# Patient Record
Sex: FEMALE | Race: WHITE | Employment: OTHER | URBAN - METROPOLITAN AREA
[De-identification: names, ages, dates, MRNs, and addresses within clinical notes are randomized per-mention and may not be internally consistent; named-entity substitution may affect disease eponyms.]

---

## 2017-01-11 ENCOUNTER — TELEPHONE (OUTPATIENT)
Dept: FAMILY MEDICINE | Facility: CLINIC | Age: 82
End: 2017-01-11

## 2017-01-11 ENCOUNTER — DOCUMENTATION ONLY (OUTPATIENT)
Dept: LAB | Facility: CLINIC | Age: 82
End: 2017-01-11

## 2017-01-11 DIAGNOSIS — N18.30 CKD (CHRONIC KIDNEY DISEASE) STAGE 3, GFR 30-59 ML/MIN (H): ICD-10-CM

## 2017-01-11 DIAGNOSIS — E78.5 HYPERLIPIDEMIA LDL GOAL <130: ICD-10-CM

## 2017-01-11 DIAGNOSIS — I10 HYPERTENSION GOAL BP (BLOOD PRESSURE) < 140/90: Primary | ICD-10-CM

## 2017-01-11 RX ORDER — LISINOPRIL AND HYDROCHLOROTHIAZIDE 20; 25 MG/1; MG/1
TABLET ORAL
Qty: 45 TABLET | Refills: 0 | Status: SHIPPED | OUTPATIENT
Start: 2017-01-11 | End: 2017-01-23

## 2017-01-11 NOTE — PROGRESS NOTES
This patient has a lab only appointment on 1/16/2017 but does not have future orders. Please review, associate diagnosis and sign pending lab orders for the upcoming appointment.  She has an appointment with Dr. Russell on 1/23/2017.    Thank you,    [facility]

## 2017-01-11 NOTE — TELEPHONE ENCOUNTER
Reason for Call:  Medication or medication refill:    Do you use a [facility]?  Name of the pharmacy and phone number for the current request:  [facility] [phone]    Name of the medication requested: lisinopril-hydrochlorothiazide (PRINZIDE,ZESTORETIC) 20-25 MG per tablet    Other request: n/a    Can we leave a detailed message on this number? YES    Phone number patient can be reached at: Home number on file [phone] (home)    Best Time: anytime    Call taken on 1/11/2017 at 12:57 PM by Roberto Dooley

## 2017-01-16 DIAGNOSIS — N18.30 CKD (CHRONIC KIDNEY DISEASE) STAGE 3, GFR 30-59 ML/MIN (H): ICD-10-CM

## 2017-01-16 DIAGNOSIS — I10 HYPERTENSION GOAL BP (BLOOD PRESSURE) < 140/90: ICD-10-CM

## 2017-01-16 LAB
ALBUMIN SERPL-MCNC: 3.5 G/DL (ref 3.4–5)
ANION GAP SERPL CALCULATED.3IONS-SCNC: 10 MMOL/L (ref 3–14)
BUN SERPL-MCNC: 50 MG/DL (ref 7–30)
CALCIUM SERPL-MCNC: 9.7 MG/DL (ref 8.5–10.1)
CHLORIDE SERPL-SCNC: 110 MMOL/L (ref 94–109)
CO2 SERPL-SCNC: 24 MMOL/L (ref 20–32)
CREAT SERPL-MCNC: 1.61 MG/DL (ref 0.52–1.04)
ERYTHROCYTE [DISTWIDTH] IN BLOOD BY AUTOMATED COUNT: 13.1 % (ref 10–15)
GFR SERPL CREATININE-BSD FRML MDRD: 31 ML/MIN/1.7M2
GLUCOSE SERPL-MCNC: 106 MG/DL (ref 70–99)
HCT VFR BLD AUTO: 33.7 % (ref 35–47)
HGB BLD-MCNC: 10.8 G/DL (ref 11.7–15.7)
MCH RBC QN AUTO: 29.8 PG (ref 26.5–33)
MCHC RBC AUTO-ENTMCNC: 32 G/DL (ref 31.5–36.5)
MCV RBC AUTO: 93 FL (ref 78–100)
PHOSPHATE SERPL-MCNC: 3.1 MG/DL (ref 2.5–4.5)
PLATELET # BLD AUTO: 274 10E9/L (ref 150–450)
POTASSIUM SERPL-SCNC: 4.6 MMOL/L (ref 3.4–5.3)
RBC # BLD AUTO: 3.62 10E12/L (ref 3.8–5.2)
SODIUM SERPL-SCNC: 144 MMOL/L (ref 133–144)
WBC # BLD AUTO: 7.9 10E9/L (ref 4–11)

## 2017-01-16 PROCEDURE — 36415 COLL VENOUS BLD VENIPUNCTURE: CPT | Performed by: FAMILY MEDICINE

## 2017-01-16 PROCEDURE — 85027 COMPLETE CBC AUTOMATED: CPT | Performed by: FAMILY MEDICINE

## 2017-01-16 PROCEDURE — 80069 RENAL FUNCTION PANEL: CPT | Performed by: FAMILY MEDICINE

## 2017-01-23 ENCOUNTER — OFFICE VISIT (OUTPATIENT)
Dept: FAMILY MEDICINE | Facility: CLINIC | Age: 82
End: 2017-01-23
Payer: MEDICARE

## 2017-01-23 VITALS
BODY MASS INDEX: 27.73 KG/M2 | SYSTOLIC BLOOD PRESSURE: 138 MMHG | HEART RATE: 84 BPM | DIASTOLIC BLOOD PRESSURE: 78 MMHG | TEMPERATURE: 98.3 F | WEIGHT: 142 LBS | OXYGEN SATURATION: 97 %

## 2017-01-23 DIAGNOSIS — E78.5 HYPERLIPIDEMIA LDL GOAL <130: ICD-10-CM

## 2017-01-23 DIAGNOSIS — I10 HYPERTENSION GOAL BP (BLOOD PRESSURE) < 140/90: Primary | ICD-10-CM

## 2017-01-23 DIAGNOSIS — N18.30 CKD (CHRONIC KIDNEY DISEASE) STAGE 3, GFR 30-59 ML/MIN (H): ICD-10-CM

## 2017-01-23 DIAGNOSIS — M10.00 IDIOPATHIC GOUT, UNSPECIFIED CHRONICITY, UNSPECIFIED SITE: ICD-10-CM

## 2017-01-23 PROCEDURE — 99214 OFFICE O/P EST MOD 30 MIN: CPT | Performed by: FAMILY MEDICINE

## 2017-01-23 RX ORDER — LISINOPRIL AND HYDROCHLOROTHIAZIDE 20; 25 MG/1; MG/1
TABLET ORAL
Qty: 45 TABLET | Refills: 1 | Status: SHIPPED | OUTPATIENT
Start: 2017-01-23 | End: 2017-08-04

## 2017-01-23 RX ORDER — ATENOLOL 100 MG/1
50 TABLET ORAL 2 TIMES DAILY
Qty: 90 TABLET | Refills: 3 | Status: SHIPPED | OUTPATIENT
Start: 2017-01-23 | End: 2017-07-18

## 2017-01-23 NOTE — NURSING NOTE
Chief Complaint   Patient presents with     Hypertension       Initial /60 mmHg  Pulse 84  Temp(Src) 98.3  F (36.8  C) (Oral)  Wt 142 lb (64.411 kg)  SpO2 97% Estimated body mass index is 27.73 kg/(m^2) as calculated from the following:    Height as of 4/22/16: 5' (1.524 m).    Weight as of this encounter: 142 lb (64.411 kg).  BP completed using cuff size: cameron Hamilton, CMA

## 2017-06-20 DIAGNOSIS — D64.89 ANEMIA DUE TO OTHER CAUSE, NOT CLASSIFIED: ICD-10-CM

## 2017-06-20 DIAGNOSIS — N18.30 CKD (CHRONIC KIDNEY DISEASE) STAGE 3, GFR 30-59 ML/MIN (H): Primary | ICD-10-CM

## 2017-06-20 DIAGNOSIS — I10 HYPERTENSION GOAL BP (BLOOD PRESSURE) < 140/90: ICD-10-CM

## 2017-06-20 DIAGNOSIS — E78.5 HYPERLIPIDEMIA LDL GOAL <130: ICD-10-CM

## 2017-06-20 RX ORDER — SIMVASTATIN 10 MG
TABLET ORAL
Qty: 15 TABLET | Refills: 0 | Status: SHIPPED | OUTPATIENT
Start: 2017-06-20 | End: 2017-07-18

## 2017-06-20 NOTE — TELEPHONE ENCOUNTER
Medication is being filled for 1 time refill only due to:  Over due for fast labs. 30 day supply sent.     - please send reminder.     Grcae Carrion RN   [facility]

## 2017-06-20 NOTE — LETTER
[facility]                                           [address] [place]  [address]    June 21, 2017    Nan Doran  [address]  [facility] [address]    Dear Nan,       We recently received a refill request for simvastatin.  We have refilled this for a one time 30 day supply only because you are due for a:    Blood pressure office visit and fasting lab appointment      Please schedule this lab appointment 4-5 days prior to the office visit.     Please call at your earliest convenience so that there will not be a delay with your future refills.          Thank you,   Your [facility] Care Team/  [phone]

## 2017-07-18 DIAGNOSIS — N18.30 CKD (CHRONIC KIDNEY DISEASE) STAGE 3, GFR 30-59 ML/MIN (H): ICD-10-CM

## 2017-07-18 DIAGNOSIS — E78.5 HYPERLIPIDEMIA LDL GOAL <130: ICD-10-CM

## 2017-07-18 DIAGNOSIS — I10 HYPERTENSION GOAL BP (BLOOD PRESSURE) < 140/90: ICD-10-CM

## 2017-07-18 DIAGNOSIS — M10.00 IDIOPATHIC GOUT, UNSPECIFIED CHRONICITY, UNSPECIFIED SITE: Primary | ICD-10-CM

## 2017-07-18 NOTE — TELEPHONE ENCOUNTER
Patient requesting refill of simvastatin to make it to appointment in August.      Atenolol message states 100 mg is currently on back order, would like a prescription change to 50 mg bid.

## 2017-07-19 RX ORDER — ATENOLOL 50 MG/1
50 TABLET ORAL 2 TIMES DAILY
Qty: 180 TABLET | Refills: 1 | Status: SHIPPED | OUTPATIENT
Start: 2017-07-19 | End: 2017-08-04

## 2017-07-19 RX ORDER — SIMVASTATIN 10 MG
TABLET ORAL
Qty: 15 TABLET | Refills: 0 | Status: SHIPPED | OUTPATIENT
Start: 2017-07-19 | End: 2017-08-04

## 2017-07-28 DIAGNOSIS — N18.30 CKD (CHRONIC KIDNEY DISEASE) STAGE 3, GFR 30-59 ML/MIN (H): ICD-10-CM

## 2017-07-28 DIAGNOSIS — I10 HYPERTENSION GOAL BP (BLOOD PRESSURE) < 140/90: ICD-10-CM

## 2017-07-28 LAB
ALBUMIN SERPL-MCNC: 3.2 G/DL (ref 3.4–5)
ANION GAP SERPL CALCULATED.3IONS-SCNC: 8 MMOL/L (ref 3–14)
BUN SERPL-MCNC: 42 MG/DL (ref 7–30)
CALCIUM SERPL-MCNC: 9.9 MG/DL (ref 8.5–10.1)
CHLORIDE SERPL-SCNC: 108 MMOL/L (ref 94–109)
CO2 SERPL-SCNC: 23 MMOL/L (ref 20–32)
CREAT SERPL-MCNC: 1.41 MG/DL (ref 0.52–1.04)
ERYTHROCYTE [DISTWIDTH] IN BLOOD BY AUTOMATED COUNT: 12.9 % (ref 10–15)
GFR SERPL CREATININE-BSD FRML MDRD: 36 ML/MIN/1.7M2
GLUCOSE SERPL-MCNC: 93 MG/DL (ref 70–99)
HCT VFR BLD AUTO: 32.7 % (ref 35–47)
HGB BLD-MCNC: 10.5 G/DL (ref 11.7–15.7)
MCH RBC QN AUTO: 30.3 PG (ref 26.5–33)
MCHC RBC AUTO-ENTMCNC: 32.1 G/DL (ref 31.5–36.5)
MCV RBC AUTO: 95 FL (ref 78–100)
PHOSPHATE SERPL-MCNC: 3.9 MG/DL (ref 2.5–4.5)
PLATELET # BLD AUTO: 279 10E9/L (ref 150–450)
POTASSIUM SERPL-SCNC: 4.6 MMOL/L (ref 3.4–5.3)
RBC # BLD AUTO: 3.46 10E12/L (ref 3.8–5.2)
SODIUM SERPL-SCNC: 139 MMOL/L (ref 133–144)
WBC # BLD AUTO: 8.2 10E9/L (ref 4–11)

## 2017-07-28 PROCEDURE — 80069 RENAL FUNCTION PANEL: CPT | Performed by: FAMILY MEDICINE

## 2017-07-28 PROCEDURE — 36415 COLL VENOUS BLD VENIPUNCTURE: CPT | Performed by: FAMILY MEDICINE

## 2017-07-28 PROCEDURE — 85027 COMPLETE CBC AUTOMATED: CPT | Performed by: FAMILY MEDICINE

## 2017-08-04 ENCOUNTER — OFFICE VISIT (OUTPATIENT)
Dept: FAMILY MEDICINE | Facility: CLINIC | Age: 82
End: 2017-08-04
Payer: MEDICARE

## 2017-08-04 VITALS
DIASTOLIC BLOOD PRESSURE: 88 MMHG | OXYGEN SATURATION: 98 % | BODY MASS INDEX: 27.82 KG/M2 | SYSTOLIC BLOOD PRESSURE: 137 MMHG | HEART RATE: 77 BPM | HEIGHT: 59 IN | TEMPERATURE: 99.5 F | WEIGHT: 138 LBS

## 2017-08-04 DIAGNOSIS — M10.00 IDIOPATHIC GOUT, UNSPECIFIED CHRONICITY, UNSPECIFIED SITE: ICD-10-CM

## 2017-08-04 DIAGNOSIS — E78.5 HYPERLIPIDEMIA LDL GOAL <130: ICD-10-CM

## 2017-08-04 DIAGNOSIS — I10 HYPERTENSION GOAL BP (BLOOD PRESSURE) < 140/90: ICD-10-CM

## 2017-08-04 PROCEDURE — 99214 OFFICE O/P EST MOD 30 MIN: CPT | Performed by: FAMILY MEDICINE

## 2017-08-04 RX ORDER — ATENOLOL 50 MG/1
50 TABLET ORAL 2 TIMES DAILY
Qty: 180 TABLET | Refills: 1 | Status: SHIPPED | OUTPATIENT
Start: 2017-08-04 | End: 2018-02-16

## 2017-08-04 RX ORDER — SIMVASTATIN 10 MG
TABLET ORAL
Qty: 45 TABLET | Refills: 3 | Status: SHIPPED | OUTPATIENT
Start: 2017-08-04 | End: 2018-02-16

## 2017-08-04 RX ORDER — LISINOPRIL AND HYDROCHLOROTHIAZIDE 20; 25 MG/1; MG/1
TABLET ORAL
Qty: 45 TABLET | Refills: 1 | Status: SHIPPED | OUTPATIENT
Start: 2017-08-04 | End: 2018-02-16

## 2017-08-04 NOTE — MR AVS SNAPSHOT
"              After Visit Summary   2017    Nan Doran    MRN: [id number]           Patient Information     Date Of Birth          1935        Visit Information        Provider Department      2017 11:20 AM Naga Russell MD [facility]        Today's Diagnoses     Hypertension goal BP (blood pressure) < 140/90        Hyperlipidemia LDL goal <130        Idiopathic gout, unspecified chronicity, unspecified site           Follow-ups after your visit        Who to contact     If you have questions or need follow up information about today's clinic visit or your schedule please contact [facility] directly at [phone].  Normal or non-critical lab and imaging results will be communicated to you by MyChart, letter or phone within 4 business days after the clinic has received the results. If you do not hear from us within 7 days, please contact the clinic through [employer] or phone. If you have a critical or abnormal lab result, we will notify you by phone as soon as possible.  Submit refill requests through [employer] or call your pharmacy and they will forward the refill request to us. Please allow 3 business days for your refill to be completed.          Additional Information About Your Visit        MyChart Information     [employer] lets you send messages to your doctor, view your test results, renew your prescriptions, schedule appointments and more. To sign up, go to [email or web address] . Click on \"Log in\" on the left side of the screen, which will take you to the Welcome page. Then click on \"Sign up Now\" on the right side of the page.     You will be asked to enter the access code listed below, as well as some personal information. Please follow the directions to create your username and password.     Your access code is: [email or web address]  Expires: 2017 11:48 AM     Your access code will  in 90 days. If you need help or a new code, please call your [facility] " "or [phone].        Care EveryWhere ID     This is your Care EveryWhere ID. This could be used by other organizations to access your [place] medical records  [id number]        Your Vitals Were     Pulse Temperature Height Pulse Oximetry BMI (Body Mass Index)       77 99.5  F (37.5  C) (Oral) 4' 11\" (1.499 m) 98% 27.87 kg/m2        Blood Pressure from Last 3 Encounters:   08/04/17 137/88   01/23/17 138/78   04/22/16 135/77    Weight from Last 3 Encounters:   08/04/17 138 lb (62.6 kg)   01/23/17 142 lb (64.4 kg)   04/22/16 146 lb (66.2 kg)              Today, you had the following     No orders found for display         Today's Medication Changes          These changes are accurate as of: 8/4/17 11:48 AM.  If you have any questions, ask your nurse or doctor.               These medicines have changed or have updated prescriptions.        Dose/Directions    atenolol 50 MG tablet   Commonly known as:  TENORMIN   This may have changed:  additional instructions   Used for:  Hypertension goal BP (blood pressure) < 140/90        Dose:  50 mg   Take 1 tablet (50 mg) by mouth 2 times daily For blood pressure Pharmacy ok to hold prescription until due   Quantity:  180 tablet   Refills:  1       simvastatin 10 MG tablet   Commonly known as:  ZOCOR   This may have changed:  additional instructions   Used for:  Hyperlipidemia LDL goal <130        Take by mouth At Bedtime for cholesterol. 1/2 tab daily   Quantity:  45 tablet   Refills:  3            Where to get your medicines      These medications were sent to [id number] - [place] - [address]  [address] [address]    Hours:  Same info as Earl  Marika Phone:  [phone]     atenolol 50 MG tablet    lisinopril-hydrochlorothiazide 20-25 MG per tablet    simvastatin 10 MG tablet                Primary Care Provider Office Phone # Fax #    Naga Russell [phone]       [address] " [place] [address]        Equal Access to Services     [facility] : Hadii dennis damon marcia Soheber, waaxda luqadaha, qaybta kaalmawilver mongeisaiaswilver, matias lindquistmilereji josé. So [facility] [phone].    ATENCIÓN: Si habla español, tiene a martinez disposición servicios gratuitos de asistencia lingüística. [facility] [phone].    We comply with applicable federal civil rights laws and [place] laws. We do not discriminate on the basis of race, color, national origin, age, disability sex, sexual orientation or gender identity.            Thank you!     Thank you for choosing [facility]  for your care. Our goal is always to provide you with excellent care. Hearing back from our patients is one way we can continue to improve our services. Please take a few minutes to complete the written survey that you may receive in the mail after your visit with us. Thank you!             Your Updated Medication List - Protect others around you: Learn how to safely use, store and throw away your medicines at www.disposemymeds.org.          This list is accurate as of: 8/4/17 11:48 AM.  Always use your most recent med list.                   Brand Name Dispense Instructions for use Diagnosis    aspirin 81 MG tablet      1 tab twice weekly.        atenolol 50 MG tablet    TENORMIN    180 tablet    Take 1 tablet (50 mg) by mouth 2 times daily For blood pressure Pharmacy ok to hold prescription until due    Hypertension goal BP (blood pressure) < 140/90       colchicine 0.6 MG tablet    COLCRYS    6 tablet    Take 2 tablets at the first sign of gout flare, take 1 additional tablet one hour later.    Idiopathic gout, unspecified chronicity, unspecified site       lisinopril-hydrochlorothiazide 20-25 MG per tablet    PRINZIDE/ZESTORETIC    45 tablet    For blood pressure in AM Take  by mouth daily. 1/2 tab daily Pharmacy ok to hold prescription until due    Hypertension goal BP (blood pressure) < 140/90        simvastatin 10 MG tablet    ZOCOR    45 tablet    Take by mouth At Bedtime for cholesterol. 1/2 tab daily    Hyperlipidemia LDL goal <130

## 2017-08-04 NOTE — PROGRESS NOTES
"SUBJECTIVE:  Nan Doran, a 82 year old female scheduled an appointment to discuss the following issues:  Follow-up htn, gout, high cholesterol, cri and anemia.  Cr improving and hgb stable. Emotionally ok.k no chest pain or shortness of breath. No nausea, vomiting or diarrhea or black/bloody stools. No acute urine changes.   Exercise some but knee painful and not as much walking as before. Active.  No chest pain or shortness of breath. Outside blood pressure reading running ok 130/80.. No hematuria.   Drinking a little more water. No urine accidents.   No gout attacks in  Years. History tophi  Medical, social, surgical, and family histories reviewed.    OBJECTIVE:  /88  Pulse 77  Temp 99.5  F (37.5  C) (Oral)  Ht 4' 11\" (1.499 m)  Wt 138 lb (62.6 kg)  SpO2 98%  BMI 27.87 kg/m2  EXAM:  GENERAL APPEARANCE: healthy, alert and no distress  EYES: EOMI,  PERRL  NECK: no adenopathy, no asymmetry, masses, or scars and thyroid normal to palpation  RESP: lungs clear to auscultation - no rales, rhonchi or wheezes  CV: regular rates and rhythm, normal S1 S2, no S3 or S4 and no murmur, click or rub -  ABDOMEN:  soft, nontender, no HSM or masses and bowel sounds normal  MS: extremities normal- no gross deformities noted, no evidence of inflammation in joints, FROM in all extremities.  MS: gout tophi seen scattered on finger and most note around distal right thumb.  PSYCH: mentation appears normal and affect normal/bright    ASSESSMENT / PLAN:  (I10) Hypertension goal BP (blood pressure) < 140/90  Comment: stable  Plan: atenolol (TENORMIN) 50 MG tablet,         lisinopril-hydrochlorothiazide         (PRINZIDE/ZESTORETIC) 20-25 MG per tablet        Continue lots of water. Patient taking hctz in pm - change to AM    (E78.5) Hyperlipidemia LDL goal <130  Comment: stable in past  Plan: simvastatin (ZOCOR) 10 MG tablet        Continue exercise. Chest pain or shortness of breath to er. Reveiwed risks and side effects of " medication      (M10.00) Idiopathic gout, unspecified chronicity, unspecified site  Comment: stable  Plan: discussed tophi on hands and allopurinol. Patient NOT interested at this point and no gout attacks in years. Consider d/c hctz. Lots of water and diet. Call/email with questions/concerns

## 2018-02-16 ENCOUNTER — RADIANT APPOINTMENT (OUTPATIENT)
Dept: GENERAL RADIOLOGY | Facility: CLINIC | Age: 83
End: 2018-02-16
Attending: FAMILY MEDICINE
Payer: MEDICARE

## 2018-02-16 ENCOUNTER — OFFICE VISIT (OUTPATIENT)
Dept: FAMILY MEDICINE | Facility: CLINIC | Age: 83
End: 2018-02-16
Payer: MEDICARE

## 2018-02-16 VITALS
WEIGHT: 140 LBS | HEART RATE: 79 BPM | BODY MASS INDEX: 28.28 KG/M2 | SYSTOLIC BLOOD PRESSURE: 146 MMHG | DIASTOLIC BLOOD PRESSURE: 82 MMHG | TEMPERATURE: 98.8 F | OXYGEN SATURATION: 99 %

## 2018-02-16 DIAGNOSIS — M25.571 PAIN IN JOINT, ANKLE AND FOOT, RIGHT: Primary | ICD-10-CM

## 2018-02-16 DIAGNOSIS — M10.00 IDIOPATHIC GOUT, UNSPECIFIED CHRONICITY, UNSPECIFIED SITE: ICD-10-CM

## 2018-02-16 DIAGNOSIS — E78.5 HYPERLIPIDEMIA LDL GOAL <130: ICD-10-CM

## 2018-02-16 DIAGNOSIS — M25.571 PAIN IN JOINT, ANKLE AND FOOT, RIGHT: ICD-10-CM

## 2018-02-16 DIAGNOSIS — I10 HYPERTENSION GOAL BP (BLOOD PRESSURE) < 140/90: ICD-10-CM

## 2018-02-16 PROCEDURE — 99214 OFFICE O/P EST MOD 30 MIN: CPT | Performed by: FAMILY MEDICINE

## 2018-02-16 PROCEDURE — 73610 X-RAY EXAM OF ANKLE: CPT | Mod: RT

## 2018-02-16 RX ORDER — SIMVASTATIN 10 MG
TABLET ORAL
Qty: 45 TABLET | Refills: 0 | Status: SHIPPED | OUTPATIENT
Start: 2018-02-16 | End: 2018-05-18

## 2018-02-16 RX ORDER — PREDNISONE 10 MG/1
TABLET ORAL
Qty: 12 TABLET | Refills: 1 | Status: SHIPPED | OUTPATIENT
Start: 2018-02-16 | End: 2018-05-18

## 2018-02-16 RX ORDER — ATENOLOL 50 MG/1
50 TABLET ORAL 2 TIMES DAILY
Qty: 180 TABLET | Refills: 0 | Status: SHIPPED | OUTPATIENT
Start: 2018-02-16 | End: 2018-05-18

## 2018-02-16 RX ORDER — LISINOPRIL AND HYDROCHLOROTHIAZIDE 20; 25 MG/1; MG/1
TABLET ORAL
Qty: 45 TABLET | Refills: 0 | Status: SHIPPED | OUTPATIENT
Start: 2018-02-16 | End: 2018-05-09

## 2018-02-16 NOTE — PROGRESS NOTES
SUBJECTIVE:  Nan Doran, a 82 year old female scheduled an appointment to discuss the following issues:  right ankle pain. Started 5 days ago.   History plantar fasiciitis in past.   Some swelling and was sitting for awhile. No Major injury but occurred getting up from chair.  No history dvt. No recent travel.   No gout attacks a while.   Heat. No wrapping.     Past Medical History:   Diagnosis Date     Gout      HTN      Hypercholesteremia      Hyperglycemia        Past Surgical History:   Procedure Laterality Date     GYN SURGERY      tubal     HC DUPLEX SCAN EXTRACRANIAL, LIMITED UNILATERAL      normal carotid ultrasound 8/2006     HEAD & NECK SURGERY      tonsils       Family History   Problem Relation Age of Onset     CEREBROVASCULAR DISEASE Father      DIABETES Paternal Grandmother      Blood Disease Daughter      lupus     Blood Disease Daughter      lupus       Social History   Substance Use Topics     Smoking status: Former Smoker     Quit date: 1/1/1984     Smokeless tobacco: Never Used     Alcohol use No       ROS:  All other ROS negative    OBJECTIVE:  /82  Pulse 79  Temp 98.8  F (37.1  C) (Oral)  Wt 140 lb (63.5 kg)  SpO2 99%  BMI 28.28 kg/m2  EXAM:  GENERAL APPEARANCE: healthy, alert and no distress  EYES: EOMI,  PERRL  NECK: no adenopathy, no asymmetry, masses, or scars and thyroid normal to palpation  RESP: lungs clear to auscultation - no rales, rhonchi or wheezes  CV: regular rates and rhythm, normal S1 S2, no S3 or S4 and no murmur, click or rub -  ABDOMEN:  soft, nontender, no HSM or masses and bowel sounds normal  MS: extremities normal- no gross deformities noted, no evidence of inflammation in joints, FROM in all extremities.  MS: swelling and mild warmth/tenderness right lateral ankle and top of lateral right foot. No redness. Some pain with RANGE OF MOTION ankle.  SKIN: no suspicious lesions or rashes  NEURO: Normal strength and tone, sensory exam grossly normal, mentation intact  and speech normal  PSYCH: mentation appears normal and affect normal/bright  PSYCH: mildly anxious    ASSESSMENT / PLAN:  (M25.571) Pain in joint, ankle and foot, right  (primary encounter diagnosis)  Comment: strain vs gout  Plan: XR Ankle Right G/E 3 Views        Ace wrap/ice and elevated. Follow-up ortho if not improving  To ER if swelling up calf/thigh or chest pain or shortness of breath  (M10.00) Idiopathic gout, unspecified chronicity, unspecified site  Comment: good chance ankle pain with gout  Plan: predniSONE (DELTASONE) 10 MG tablet        Reveiwed risks and side effects of medication  Patient NOT interested in allopurinol. Drink lots of water.    (I10) Hypertension goal BP (blood pressure) < 140/90  Plan: atenolol (TENORMIN) 50 MG tablet,         lisinopril-hydrochlorothiazide         (PRINZIDE/ZESTORETIC) 20-25 MG per tablet     A little high today. Return to clinic in next month for recheck blood pressure/labs. Chest pain or shortness of breath to er    (E78.5) Hyperlipidemia LDL goal <130  Plan: simvastatin (ZOCOR) 10 MG tablet        Future labs. Reveiwed risks and side effects of medication        Naga Russell

## 2018-02-16 NOTE — MR AVS SNAPSHOT
"              After Visit Summary   2018    Nan Doran    MRN: [id number]           Patient Information     Date Of Birth          1935        Visit Information        Provider Department      2018 3:40 PM Naga Russell MD [facility]        Today's Diagnoses     Pain in joint, ankle and foot, right    -  1    Idiopathic gout, unspecified chronicity, unspecified site           Follow-ups after your visit        Who to contact     If you have questions or need follow up information about today's clinic visit or your schedule please contact [facility] directly at [phone].  Normal or non-critical lab and imaging results will be communicated to you by [employer] [facility] [employer], letter or phone within 4 business days after the clinic has received the results. If you do not hear from us within 7 days, please contact the clinic through [employer] or phone. If you have a critical or abnormal lab result, we will notify you by phone as soon as possible.  Submit refill requests through [employer] or call your pharmacy and they will forward the refill request to us. Please allow 3 business days for your refill to be completed.          Additional Information About Your Visit        MyChart Information     [employer] lets you send messages to your doctor, view your test results, renew your prescriptions, schedule appointments and more. To sign up, go to [email or web address] . Click on \"Log in\" on the left side of the screen, which will take you to the Welcome page. Then click on \"Sign up Now\" on the right side of the page.     You will be asked to enter the access code listed below, as well as some personal information. Please follow the directions to create your username and password.     Your access code is: [email or web address]  Expires: 2018  4:14 PM     Your access code will  in 90 days. If you need help or a new code, please call your [facility] or [phone].        Care EveryWhere ID "     This is your Care EveryWhere ID. This could be used by other organizations to access your [place] medical records  [id number]        Your Vitals Were     Pulse Temperature Pulse Oximetry BMI (Body Mass Index)          79 98.8  F (37.1  C) (Oral) 99% 28.28 kg/m2         Blood Pressure from Last 3 Encounters:   02/16/18 158/81   08/04/17 137/88   01/23/17 138/78    Weight from Last 3 Encounters:   02/16/18 140 lb (63.5 kg)   08/04/17 138 lb (62.6 kg)   01/23/17 142 lb (64.4 kg)                 Today's Medication Changes          These changes are accurate as of 2/16/18  4:14 PM.  If you have any questions, ask your nurse or doctor.               Start taking these medicines.        Dose/Directions    predniSONE 10 MG tablet   Commonly known as:  DELTASONE   Used for:  Idiopathic gout, unspecified chronicity, unspecified site   Started by:  Naga Russell MD        Take  by mouth. Take 3 for 2 days, then 2 for 2 days, then 1 for 2 days, then 1/2 for 2 days. Take with food in AM for gout attacks   Quantity:  12 tablet   Refills:  1            Where to get your medicines      These medications were sent to [facility] - [place], [place] - [address] [facility]  [address] [facility] [address]     Phone:  [phone]     predniSONE 10 MG tablet                Primary Care Provider Office Phone # Fax #    Naga Russell [phone] [address] [facility] [address]        Equal Access to Services     MARIA TERESA GERARDO : Hadjessica saunderso Soheber, waaxda luqadaha, qaybta kaalmada adeegyada, matias josé. So [facility] [phone].    ATENCIÓN: Si habla español, tiene a mratinez disposición servicios gratuitos de asistencia lingüística. Llame al [phone].    We comply with applicable federal civil rights laws and [place] laws. We do not discriminate on the basis of race, color, national origin, age, disability, sex, sexual orientation, or gender  identity.            Thank you!     Thank you for choosing [facility]  [facility]. Our goal is always to provide you with excellent care. Hearing back from our patients is one way we can continue to improve our services. Please take a few minutes to complete the written survey that you may receive in the mail after your visit with us. Thank you!             Your Updated Medication List - Protect others around you: Learn how to safely use, store and throw away your medicines at www.disposemymeds.org.          This list is accurate as of 2/16/18  4:14 PM.  Always use your most recent med list.                   Brand Name Dispense Instructions for use Diagnosis    aspirin 81 MG tablet      1 tab twice weekly.        atenolol 50 MG tablet    TENORMIN    180 tablet    Take 1 tablet (50 mg) by mouth 2 times daily For blood pressure Pharmacy ok to hold prescription until due    Hypertension goal BP (blood pressure) < 140/90       colchicine 0.6 MG tablet    COLCRYS    6 tablet    Take 2 tablets at the first sign of gout flare, take 1 additional tablet one hour later.    Idiopathic gout, unspecified chronicity, unspecified site       lisinopril-hydrochlorothiazide 20-25 MG per tablet    PRINZIDE/ZESTORETIC    45 tablet    For blood pressure in AM Take  by mouth daily. 1/2 tab daily Pharmacy ok to hold prescription until due    Hypertension goal BP (blood pressure) < 140/90       predniSONE 10 MG tablet    DELTASONE    12 tablet    Take  by mouth. Take 3 for 2 days, then 2 for 2 days, then 1 for 2 days, then 1/2 for 2 days. Take with food in AM for gout attacks    Idiopathic gout, unspecified chronicity, unspecified site       simvastatin 10 MG tablet    ZOCOR    45 tablet    Take by mouth At Bedtime for cholesterol. 1/2 tab daily    Hyperlipidemia LDL goal <130

## 2018-02-16 NOTE — NURSING NOTE
"Chief Complaint   Patient presents with     Musculoskeletal Problem     5 days. No injury. Swelling started a few days ago. Painful.        Initial /81  Pulse 79  Temp 98.8  F (37.1  C) (Oral)  Wt 140 lb (63.5 kg)  SpO2 99%  BMI 28.28 kg/m2 Estimated body mass index is 28.28 kg/(m^2) as calculated from the following:    Height as of 8/4/17: 4' 11\" (1.499 m).    Weight as of this encounter: 140 lb (63.5 kg).  Medication Reconciliation: complete    Roshni Rodriguez MA    "

## 2018-05-09 ENCOUNTER — TELEPHONE (OUTPATIENT)
Dept: FAMILY MEDICINE | Facility: CLINIC | Age: 83
End: 2018-05-09

## 2018-05-09 ENCOUNTER — DOCUMENTATION ONLY (OUTPATIENT)
Dept: LAB | Facility: CLINIC | Age: 83
End: 2018-05-09

## 2018-05-09 DIAGNOSIS — I10 HYPERTENSION GOAL BP (BLOOD PRESSURE) < 140/90: ICD-10-CM

## 2018-05-09 DIAGNOSIS — N18.30 CKD (CHRONIC KIDNEY DISEASE) STAGE 3, GFR 30-59 ML/MIN (H): Primary | ICD-10-CM

## 2018-05-09 DIAGNOSIS — D64.89 ANEMIA DUE TO OTHER CAUSE, NOT CLASSIFIED: ICD-10-CM

## 2018-05-09 DIAGNOSIS — E78.5 HYPERLIPIDEMIA LDL GOAL <130: ICD-10-CM

## 2018-05-09 RX ORDER — LISINOPRIL AND HYDROCHLOROTHIAZIDE 20; 25 MG/1; MG/1
TABLET ORAL
Qty: 15 TABLET | Refills: 0 | Status: SHIPPED | OUTPATIENT
Start: 2018-05-09 | End: 2018-05-18

## 2018-05-09 NOTE — TELEPHONE ENCOUNTER
Patient has lab appointment tomorrow and appointment with you next Friday, 5-18-18 but will run out of lisinopril before then and would like a refill please.c    Thank you.

## 2018-05-09 NOTE — PROGRESS NOTES
.Per lab scrubbing protocol patientt is not due for any lab test at this time. Please review chart, if nothing is needed please contact the patient and cancel the appointment.  Thank you,  Jackie

## 2018-05-09 NOTE — PROGRESS NOTES
Please review lab orders sign and close encounter. Batsheva Roth MA/STACEY    BP/medication check appt 5/18/18

## 2018-05-10 DIAGNOSIS — N18.30 CKD (CHRONIC KIDNEY DISEASE) STAGE 3, GFR 30-59 ML/MIN (H): ICD-10-CM

## 2018-05-10 DIAGNOSIS — D64.89 ANEMIA DUE TO OTHER CAUSE, NOT CLASSIFIED: ICD-10-CM

## 2018-05-10 DIAGNOSIS — I10 HYPERTENSION GOAL BP (BLOOD PRESSURE) < 140/90: ICD-10-CM

## 2018-05-10 LAB
ALBUMIN SERPL-MCNC: 3.3 G/DL (ref 3.4–5)
ANION GAP SERPL CALCULATED.3IONS-SCNC: 10 MMOL/L (ref 3–14)
BASOPHILS # BLD AUTO: 0 10E9/L (ref 0–0.2)
BASOPHILS NFR BLD AUTO: 0.5 %
BUN SERPL-MCNC: 27 MG/DL (ref 7–30)
CALCIUM SERPL-MCNC: 9.6 MG/DL (ref 8.5–10.1)
CHLORIDE SERPL-SCNC: 106 MMOL/L (ref 94–109)
CO2 SERPL-SCNC: 24 MMOL/L (ref 20–32)
CREAT SERPL-MCNC: 1.18 MG/DL (ref 0.52–1.04)
DIFFERENTIAL METHOD BLD: ABNORMAL
EOSINOPHIL # BLD AUTO: 0.4 10E9/L (ref 0–0.7)
EOSINOPHIL NFR BLD AUTO: 4.9 %
ERYTHROCYTE [DISTWIDTH] IN BLOOD BY AUTOMATED COUNT: 12.4 % (ref 10–15)
GFR SERPL CREATININE-BSD FRML MDRD: 44 ML/MIN/1.7M2
GLUCOSE SERPL-MCNC: 94 MG/DL (ref 70–99)
HCT VFR BLD AUTO: 34.9 % (ref 35–47)
HGB BLD-MCNC: 11.4 G/DL (ref 11.7–15.7)
LYMPHOCYTES # BLD AUTO: 2.2 10E9/L (ref 0.8–5.3)
LYMPHOCYTES NFR BLD AUTO: 27.1 %
MCH RBC QN AUTO: 30.6 PG (ref 26.5–33)
MCHC RBC AUTO-ENTMCNC: 32.7 G/DL (ref 31.5–36.5)
MCV RBC AUTO: 94 FL (ref 78–100)
MONOCYTES # BLD AUTO: 0.9 10E9/L (ref 0–1.3)
MONOCYTES NFR BLD AUTO: 11.7 %
NEUTROPHILS # BLD AUTO: 4.5 10E9/L (ref 1.6–8.3)
NEUTROPHILS NFR BLD AUTO: 55.8 %
PHOSPHATE SERPL-MCNC: 3 MG/DL (ref 2.5–4.5)
PLATELET # BLD AUTO: 340 10E9/L (ref 150–450)
POTASSIUM SERPL-SCNC: 5 MMOL/L (ref 3.4–5.3)
RBC # BLD AUTO: 3.72 10E12/L (ref 3.8–5.2)
SODIUM SERPL-SCNC: 140 MMOL/L (ref 133–144)
WBC # BLD AUTO: 8 10E9/L (ref 4–11)

## 2018-05-10 PROCEDURE — 85025 COMPLETE CBC W/AUTO DIFF WBC: CPT | Performed by: FAMILY MEDICINE

## 2018-05-10 PROCEDURE — 36415 COLL VENOUS BLD VENIPUNCTURE: CPT | Performed by: FAMILY MEDICINE

## 2018-05-10 PROCEDURE — 80069 RENAL FUNCTION PANEL: CPT | Performed by: FAMILY MEDICINE

## 2018-05-11 NOTE — TELEPHONE ENCOUNTER
Pt notified that refill was done when she was here for lab appointment yesterday.  Savi Elder RN

## 2018-05-18 ENCOUNTER — OFFICE VISIT (OUTPATIENT)
Dept: FAMILY MEDICINE | Facility: CLINIC | Age: 83
End: 2018-05-18
Payer: MEDICARE

## 2018-05-18 VITALS
SYSTOLIC BLOOD PRESSURE: 139 MMHG | HEART RATE: 77 BPM | BODY MASS INDEX: 28.22 KG/M2 | HEIGHT: 59 IN | RESPIRATION RATE: 16 BRPM | TEMPERATURE: 98.9 F | OXYGEN SATURATION: 98 % | WEIGHT: 140 LBS | DIASTOLIC BLOOD PRESSURE: 55 MMHG

## 2018-05-18 DIAGNOSIS — E78.5 HYPERLIPIDEMIA LDL GOAL <130: ICD-10-CM

## 2018-05-18 DIAGNOSIS — I10 HYPERTENSION GOAL BP (BLOOD PRESSURE) < 140/90: ICD-10-CM

## 2018-05-18 DIAGNOSIS — M10.00 IDIOPATHIC GOUT, UNSPECIFIED CHRONICITY, UNSPECIFIED SITE: ICD-10-CM

## 2018-05-18 PROCEDURE — 99214 OFFICE O/P EST MOD 30 MIN: CPT | Performed by: FAMILY MEDICINE

## 2018-05-18 RX ORDER — PREDNISONE 10 MG/1
TABLET ORAL
Qty: 24 TABLET | Refills: 1 | Status: SHIPPED | OUTPATIENT
Start: 2018-05-18 | End: 2019-07-17

## 2018-05-18 RX ORDER — ALLOPURINOL 100 MG/1
100 TABLET ORAL DAILY
Qty: 90 TABLET | Refills: 1 | Status: SHIPPED | OUTPATIENT
Start: 2018-05-18 | End: 2019-01-10

## 2018-05-18 RX ORDER — ATENOLOL 50 MG/1
50 TABLET ORAL 2 TIMES DAILY
Qty: 180 TABLET | Refills: 0 | Status: SHIPPED | OUTPATIENT
Start: 2018-05-18 | End: 2018-10-15

## 2018-05-18 RX ORDER — SIMVASTATIN 10 MG
TABLET ORAL
Qty: 45 TABLET | Refills: 0 | Status: SHIPPED | OUTPATIENT
Start: 2018-05-18 | End: 2018-09-18

## 2018-05-18 RX ORDER — LISINOPRIL AND HYDROCHLOROTHIAZIDE 20; 25 MG/1; MG/1
TABLET ORAL
Qty: 15 TABLET | Refills: 0 | Status: SHIPPED | OUTPATIENT
Start: 2018-05-18 | End: 2018-07-23

## 2018-05-18 NOTE — PROGRESS NOTES
"SUBJECTIVE:  Nan Doran, a 82 year old female scheduled an appointment to discuss the following issues:  Follow-up htn, high cholesterol and gout.   Exercise more now with summer. Some milk but not vitaminD supplement.   Gout attack 2 months ago - prednisone helpful. No chest pain or shortness of breath.   No nausea, vomiting or diarrhea or black or bloody stools. No urine changes or hematuria.   Emotionally doing ok. Lives with son and family.    Past Medical History:   Diagnosis Date     Gout      HTN      Hypercholesteremia      Hyperglycemia        Past Surgical History:   Procedure Laterality Date     GYN SURGERY      tubal     HC DUPLEX SCAN EXTRACRANIAL, LIMITED UNILATERAL      normal carotid ultrasound 8/2006     HEAD & NECK SURGERY      tonsils       Family History   Problem Relation Age of Onset     CEREBROVASCULAR DISEASE Father      DIABETES Paternal Grandmother      Blood Disease Daughter      lupus     Blood Disease Daughter      lupus       Social History   Substance Use Topics     Smoking status: Former Smoker     Quit date: 1/1/1984     Smokeless tobacco: Never Used     Alcohol use No       ROS:  All other ROS negative  OBJECTIVE:  /55  Pulse 77  Temp 98.9  F (37.2  C) (Oral)  Resp 16  Ht 4' 11\" (1.499 m)  Wt 140 lb (63.5 kg)  SpO2 98%  BMI 28.28 kg/m2  EXAM:  GENERAL APPEARANCE: healthy, alert and no distress  EYES: EOMI,  PERRL  HENT: ear canals and TM's normal and nose and mouth without ulcers or lesions  NECK: no adenopathy, no asymmetry, masses, or scars and thyroid normal to palpation  RESP: lungs clear to auscultation - no rales, rhonchi or wheezes  CV: regular rates and rhythm, normal S1 S2, no S3 or S4 and no murmur, click or rub -  ABDOMEN:  soft, nontender, no HSM or masses and bowel sounds normal  MS: extremities normal- no gross deformities noted, no evidence of inflammation in joints, FROM in all extremities.  MS: mild swelling left 1st/2nd toe and gouty deposits seen in " those 2 toes and right thumb  PSYCH: mentation appears normal and affect normal/bright    ASSESSMENT / PLAN:  (I10) Hypertension goal BP (blood pressure) < 140/90  Comment: stable  Plan: lisinopril-hydrochlorothiazide         (PRINZIDE/ZESTORETIC) 20-25 MG per tablet,         atenolol (TENORMIN) 50 MG tablet        Recheck in 6 months      (E78.5) Hyperlipidemia LDL goal <130  Comment: stable  Plan: simvastatin (ZOCOR) 10 MG tablet        Recheck in 6 months  Chest pain or shortness of breath to er.    (M10.00) Idiopathic gout, unspecified chronicity, unspecified site  Comment: needs help.   Plan: predniSONE (DELTASONE) 10 MG tablet,         allopurinol (ZYLOPRIM) 100 MG tablet        Start prednisone now and allopurinol in one week. Reveiwed risks and side effects of medication  Recheck in 6 months  Uric acid in future. Consider rheum input. Drink lots of water. Return to clinic if worse/new issues. Call/email with questions/concerns     Naga Russell

## 2018-05-18 NOTE — NURSING NOTE
"Chief Complaint   Patient presents with     Hypertension     Health Maintenance     fall       Initial /55  Pulse 77  Temp 98.9  F (37.2  C) (Oral)  Resp 16  Ht 4' 11\" (1.499 m)  Wt 140 lb (63.5 kg)  SpO2 98%  BMI 28.28 kg/m2 Estimated body mass index is 28.28 kg/(m^2) as calculated from the following:    Height as of this encounter: 4' 11\" (1.499 m).    Weight as of this encounter: 140 lb (63.5 kg).    Savi Hamilton, CMA    "

## 2018-05-18 NOTE — MR AVS SNAPSHOT
"              After Visit Summary   2018    Nan Doran    MRN: [id number]           Patient Information     Date Of Birth          1935        Visit Information        Provider Department      2018 1:40 PM Naga Russell MD [facility]        Today's Diagnoses     Hypertension goal BP (blood pressure) < 140/90        Hyperlipidemia LDL goal <130        Idiopathic gout, unspecified chronicity, unspecified site           Follow-ups after your visit        Who to contact     If you have questions or need follow up information about today's clinic visit or your schedule please contact [facility] directly at [phone].  Normal or non-critical lab and imaging results will be communicated to you by MyChart, letter or phone within 4 business days after the clinic has received the results. If you do not hear from us within 7 days, please contact the clinic through J[employer] or phone. If you have a critical or abnormal lab result, we will notify you by phone as soon as possible.  Submit refill requests through [employer] or call your pharmacy and they will forward the refill request to us. Please allow 3 business days for your refill to be completed.          Additional Information About Your Visit        MyChart Information     [employer] lets you send messages to your doctor, view your test results, renew your prescriptions, schedule appointments and more. To sign up, go to [email or web address] . Click on \"Log in\" on the left side of the screen, which will take you to the Welcome page. Then click on \"Sign up Now\" on the right side of the page.     You will be asked to enter the access code listed below, as well as some personal information. Please follow the directions to create your username and password.     Your access code is: [email or web address]  Expires: 2018  2:11 PM     Your access code will  in 90 days. If you need help or a new code, please call your [place] clinic " "or [phone].        Care EveryWhere ID     This is your Care EveryWhere ID. This could be used by other organizations to access your [place] medical records  [id number]        Your Vitals Were     Pulse Temperature Respirations Height Pulse Oximetry BMI (Body Mass Index)    77 98.9  F (37.2  C) (Oral) 16 4' 11\" (1.499 m) 98% 28.28 kg/m2       Blood Pressure from Last 3 Encounters:   05/18/18 139/55   02/16/18 146/82   08/04/17 137/88    Weight from Last 3 Encounters:   05/18/18 140 lb (63.5 kg)   02/16/18 140 lb (63.5 kg)   08/04/17 138 lb (62.6 kg)              Today, you had the following     No orders found for display         Today's Medication Changes          These changes are accurate as of 5/18/18  2:11 PM.  If you have any questions, ask your nurse or doctor.               Start taking these medicines.        Dose/Directions    allopurinol 100 MG tablet   Commonly known as:  ZYLOPRIM   Used for:  Idiopathic gout, unspecified chronicity, unspecified site        Dose:  100 mg   Take 1 tablet (100 mg) by mouth daily New to help prevent gout attacks   Quantity:  90 tablet   Refills:  1            Where to get your medicines      These medications were sent to [id number] - [place] - [address]  [address] [id number]    Hours:  Same info as [facility] [place] Phone:  [phone]     allopurinol 100 MG tablet    atenolol 50 MG tablet    lisinopril-hydrochlorothiazide 20-25 MG per tablet    predniSONE 10 MG tablet    simvastatin 10 MG tablet                Primary Care Provider Office Phone # Fax #    Naga Russell [phone] [address] [facility] [address]        Equal Access to Services     MARIA TERESA GERARDO AH: Kaitlynn Raza, waslyda luqadaha, qaybta kaalmada lamar, matias josé. [facility] [phone].    ATENCIÓN: Si habla español, tiene a martinez disposición servicios gratuitos de asistencia " lingüísticaMarleni Huerta al [phone].    We comply with applicable federal civil rights laws and [place] laws. We do not discriminate on the basis of race, color, national origin, age, disability, sex, sexual orientation, or gender identity.            Thank you!     [facility] you for [facility]  [facility]. Our goal is always to provide you with excellent care. Hearing back from our patients is one way we can continue to improve our services. Please take a few minutes to complete the written survey that you may receive in the mail after your visit with us. Thank you!             Your Updated Medication List - Protect others around you: Learn how to safely use, store and throw away your medicines at www.disposemymeds.org.          This list is accurate as of 5/18/18  2:11 PM.  Always use your most recent med list.                   Brand Name Dispense Instructions for use Diagnosis    allopurinol 100 MG tablet    ZYLOPRIM    90 tablet    Take 1 tablet (100 mg) by mouth daily New to help prevent gout attacks    Idiopathic gout, unspecified chronicity, unspecified site       aspirin 81 MG tablet      1 tab twice weekly.        atenolol 50 MG tablet    TENORMIN    180 tablet    Take 1 tablet (50 mg) by mouth 2 times daily For blood pressure Pharmacy ok to hold prescription until due    Hypertension goal BP (blood pressure) < 140/90       colchicine 0.6 MG tablet    COLCRYS    6 tablet    Take 2 tablets at the first sign of gout flare, take 1 additional tablet one hour later.    Idiopathic gout, unspecified chronicity, unspecified site       lisinopril-hydrochlorothiazide 20-25 MG per tablet    PRINZIDE/ZESTORETIC    15 tablet    For blood pressure in AM Take  by mouth daily. 1/2 tab daily    Hypertension goal BP (blood pressure) < 140/90       predniSONE 10 MG tablet    DELTASONE    24 tablet    Take  by mouth. Take 3 for 2 days, then 2 for 2 days, then 1 for 2 days, then 1/2 for 2 days. Take with  food in AM for gout attacks    Idiopathic gout, unspecified chronicity, unspecified site       simvastatin 10 MG tablet    ZOCOR    45 tablet    Take by mouth At Bedtime for cholesterol. 1/2 tab daily    Hyperlipidemia LDL goal <130

## 2018-07-23 DIAGNOSIS — I10 HYPERTENSION GOAL BP (BLOOD PRESSURE) < 140/90: ICD-10-CM

## 2018-07-23 RX ORDER — LISINOPRIL AND HYDROCHLOROTHIAZIDE 20; 25 MG/1; MG/1
TABLET ORAL
Qty: 45 TABLET | Refills: 0 | Status: SHIPPED | OUTPATIENT
Start: 2018-07-23 | End: 2018-11-29

## 2018-09-18 ENCOUNTER — TELEPHONE (OUTPATIENT)
Dept: FAMILY MEDICINE | Facility: CLINIC | Age: 83
End: 2018-09-18

## 2018-09-18 DIAGNOSIS — E78.5 HYPERLIPIDEMIA LDL GOAL <130: ICD-10-CM

## 2018-09-18 RX ORDER — SIMVASTATIN 10 MG
TABLET ORAL
Qty: 45 TABLET | Refills: 0 | Status: SHIPPED | OUTPATIENT
Start: 2018-09-18 | End: 2018-09-18

## 2018-09-18 NOTE — TELEPHONE ENCOUNTER
Patient informed of refill sent to pharmacy. Due for labs and provider appointment in November    Ange GARCIAN, RN, CPN

## 2018-09-18 NOTE — TELEPHONE ENCOUNTER
Pending Prescriptions:                       Disp   Refills    simvastatin (ZOCOR) 10 MG tablet          45 tab*0            Sig: Take by mouth At Bedtime for cholesterol. 1/2 tab           daily      Heather Swain MA    2nd request. Please send new Rx or denial

## 2018-09-19 RX ORDER — SIMVASTATIN 10 MG
TABLET ORAL
Qty: 45 TABLET | Refills: 0 | Status: SHIPPED | OUTPATIENT
Start: 2018-09-19 | End: 2018-11-29

## 2018-10-15 DIAGNOSIS — I10 HYPERTENSION GOAL BP (BLOOD PRESSURE) < 140/90: ICD-10-CM

## 2018-10-15 RX ORDER — ATENOLOL 50 MG/1
TABLET ORAL
Qty: 180 TABLET | Refills: 0 | Status: SHIPPED | OUTPATIENT
Start: 2018-10-15 | End: 2019-01-10

## 2018-11-29 ENCOUNTER — TELEPHONE (OUTPATIENT)
Dept: FAMILY MEDICINE | Facility: CLINIC | Age: 83
End: 2018-11-29

## 2018-11-29 DIAGNOSIS — E78.5 HYPERLIPIDEMIA LDL GOAL <130: ICD-10-CM

## 2018-11-29 DIAGNOSIS — I10 HYPERTENSION GOAL BP (BLOOD PRESSURE) < 140/90: ICD-10-CM

## 2018-11-29 RX ORDER — SIMVASTATIN 10 MG
TABLET ORAL
Qty: 23 TABLET | Refills: 0 | Status: SHIPPED | OUTPATIENT
Start: 2018-11-29 | End: 2019-01-10

## 2018-11-29 RX ORDER — LISINOPRIL AND HYDROCHLOROTHIAZIDE 20; 25 MG/1; MG/1
TABLET ORAL
Qty: 23 TABLET | Refills: 0 | Status: SHIPPED | OUTPATIENT
Start: 2018-11-29 | End: 2019-01-10

## 2018-11-29 NOTE — TELEPHONE ENCOUNTER
Patient would like to come in after the holidays for med refill but will run out of medication before this requesting refill for simvastatin, lisinopril.     Please call patient to let her know if this will be sent to pharmacy    Thank you

## 2018-11-29 NOTE — TELEPHONE ENCOUNTER
Pt had been advised previously that appointment was due in Nov.  Pt has appointment's scheduled in Jan.  Refills to appointment time sent to pharmacy.  Pt notified.  Savi GARCIAN, RN

## 2019-01-01 ENCOUNTER — OFFICE VISIT (OUTPATIENT)
Dept: FAMILY MEDICINE | Facility: CLINIC | Age: 84
End: 2019-01-01
Payer: COMMERCIAL

## 2019-01-01 ENCOUNTER — TELEPHONE (OUTPATIENT)
Dept: FAMILY MEDICINE | Facility: CLINIC | Age: 84
End: 2019-01-01

## 2019-01-01 ENCOUNTER — ANCILLARY PROCEDURE (OUTPATIENT)
Dept: GENERAL RADIOLOGY | Facility: CLINIC | Age: 84
End: 2019-01-01
Attending: NURSE PRACTITIONER
Payer: COMMERCIAL

## 2019-01-01 ENCOUNTER — MEDICAL CORRESPONDENCE (OUTPATIENT)
Dept: HEALTH INFORMATION MANAGEMENT | Facility: CLINIC | Age: 84
End: 2019-01-01

## 2019-01-01 ENCOUNTER — TRANSFERRED RECORDS (OUTPATIENT)
Dept: HEALTH INFORMATION MANAGEMENT | Facility: CLINIC | Age: 84
End: 2019-01-01

## 2019-01-01 ENCOUNTER — HEALTH MAINTENANCE LETTER (OUTPATIENT)
Age: 84
End: 2019-01-01

## 2019-01-01 VITALS
BODY MASS INDEX: 25.45 KG/M2 | WEIGHT: 126 LBS | SYSTOLIC BLOOD PRESSURE: 112 MMHG | DIASTOLIC BLOOD PRESSURE: 69 MMHG | TEMPERATURE: 98.2 F | OXYGEN SATURATION: 96 % | HEART RATE: 93 BPM

## 2019-01-01 VITALS
HEART RATE: 79 BPM | BODY MASS INDEX: 26.46 KG/M2 | SYSTOLIC BLOOD PRESSURE: 103 MMHG | TEMPERATURE: 97.2 F | WEIGHT: 131 LBS | DIASTOLIC BLOOD PRESSURE: 62 MMHG | OXYGEN SATURATION: 100 %

## 2019-01-01 VITALS
TEMPERATURE: 97.7 F | WEIGHT: 129 LBS | SYSTOLIC BLOOD PRESSURE: 97 MMHG | HEART RATE: 80 BPM | OXYGEN SATURATION: 100 % | BODY MASS INDEX: 26.05 KG/M2 | DIASTOLIC BLOOD PRESSURE: 63 MMHG | RESPIRATION RATE: 16 BRPM

## 2019-01-01 VITALS
WEIGHT: 132.2 LBS | BODY MASS INDEX: 26.7 KG/M2 | HEART RATE: 59 BPM | SYSTOLIC BLOOD PRESSURE: 99 MMHG | DIASTOLIC BLOOD PRESSURE: 61 MMHG | TEMPERATURE: 97.3 F

## 2019-01-01 VITALS
HEART RATE: 85 BPM | WEIGHT: 133 LBS | TEMPERATURE: 98.3 F | BODY MASS INDEX: 26.86 KG/M2 | SYSTOLIC BLOOD PRESSURE: 93 MMHG | DIASTOLIC BLOOD PRESSURE: 59 MMHG | OXYGEN SATURATION: 99 %

## 2019-01-01 DIAGNOSIS — L29.9 ITCH OF SKIN: ICD-10-CM

## 2019-01-01 DIAGNOSIS — R06.02 SOB (SHORTNESS OF BREATH): ICD-10-CM

## 2019-01-01 DIAGNOSIS — I10 HYPERTENSION GOAL BP (BLOOD PRESSURE) < 140/90: ICD-10-CM

## 2019-01-01 DIAGNOSIS — I50.9 CONGESTIVE HEART FAILURE, UNSPECIFIED HF CHRONICITY, UNSPECIFIED HEART FAILURE TYPE (H): Primary | ICD-10-CM

## 2019-01-01 DIAGNOSIS — M10.00 IDIOPATHIC GOUT, UNSPECIFIED CHRONICITY, UNSPECIFIED SITE: ICD-10-CM

## 2019-01-01 DIAGNOSIS — M25.471 ANKLE EDEMA, BILATERAL: ICD-10-CM

## 2019-01-01 DIAGNOSIS — R60.0 PEDAL EDEMA: Primary | ICD-10-CM

## 2019-01-01 DIAGNOSIS — R05.9 COUGH: ICD-10-CM

## 2019-01-01 DIAGNOSIS — J18.9 PNEUMONIA DUE TO INFECTIOUS ORGANISM, UNSPECIFIED LATERALITY, UNSPECIFIED PART OF LUNG: Primary | ICD-10-CM

## 2019-01-01 DIAGNOSIS — I48.0 PAROXYSMAL ATRIAL FIBRILLATION (H): ICD-10-CM

## 2019-01-01 DIAGNOSIS — M25.473 ANKLE EDEMA: Primary | ICD-10-CM

## 2019-01-01 DIAGNOSIS — N18.30 CKD (CHRONIC KIDNEY DISEASE) STAGE 3, GFR 30-59 ML/MIN (H): ICD-10-CM

## 2019-01-01 DIAGNOSIS — M10.00 IDIOPATHIC GOUT, UNSPECIFIED CHRONICITY, UNSPECIFIED SITE: Primary | ICD-10-CM

## 2019-01-01 DIAGNOSIS — M79.89 LEG SWELLING: ICD-10-CM

## 2019-01-01 DIAGNOSIS — R05.9 COUGH: Primary | ICD-10-CM

## 2019-01-01 DIAGNOSIS — M25.472 ANKLE EDEMA, BILATERAL: ICD-10-CM

## 2019-01-01 DIAGNOSIS — M10.9 GOUT, UNSPECIFIED CAUSE, UNSPECIFIED CHRONICITY, UNSPECIFIED SITE: ICD-10-CM

## 2019-01-01 LAB
ALBUMIN SERPL-MCNC: 2.8 G/DL (ref 3.4–5)
ANION GAP SERPL CALCULATED.3IONS-SCNC: 6 MMOL/L (ref 3–14)
ANION GAP SERPL CALCULATED.3IONS-SCNC: 7 MMOL/L (ref 3–14)
ANISOCYTOSIS BLD QL SMEAR: ABNORMAL
BASOPHILS # BLD AUTO: 0.1 10E9/L (ref 0–0.2)
BASOPHILS NFR BLD AUTO: 1 %
BUN SERPL-MCNC: 32 MG/DL (ref 7–30)
BUN SERPL-MCNC: 37 MG/DL (ref 7–30)
CALCIUM SERPL-MCNC: 8.8 MG/DL (ref 8.5–10.1)
CALCIUM SERPL-MCNC: 9.6 MG/DL (ref 8.5–10.1)
CHLORIDE SERPL-SCNC: 103 MMOL/L (ref 94–109)
CHLORIDE SERPL-SCNC: 104 MMOL/L (ref 94–109)
CO2 SERPL-SCNC: 28 MMOL/L (ref 20–32)
CO2 SERPL-SCNC: 29 MMOL/L (ref 20–32)
CREAT SERPL-MCNC: 1.47 MG/DL (ref 0.52–1.04)
CREAT SERPL-MCNC: 1.5 MG/DL (ref 0.52–1.04)
DIFFERENTIAL METHOD BLD: ABNORMAL
EJECTION FRACTION: NORMAL %
EOSINOPHIL # BLD AUTO: 0.2 10E9/L (ref 0–0.7)
EOSINOPHIL NFR BLD AUTO: 3 %
ERYTHROCYTE [DISTWIDTH] IN BLOOD BY AUTOMATED COUNT: 16.3 % (ref 10–15)
GFR SERPL CREATININE-BSD FRML MDRD: 32 ML/MIN/{1.73_M2}
GFR SERPL CREATININE-BSD FRML MDRD: 33 ML/MIN/{1.73_M2}
GLUCOSE SERPL-MCNC: 121 MG/DL (ref 70–99)
GLUCOSE SERPL-MCNC: 75 MG/DL (ref 70–99)
HCT VFR BLD AUTO: 33.5 % (ref 35–47)
HGB BLD-MCNC: 9.5 G/DL (ref 11.7–15.7)
HYPOCHROMIA BLD QL: PRESENT
LYMPHOCYTES # BLD AUTO: 1.5 10E9/L (ref 0.8–5.3)
LYMPHOCYTES NFR BLD AUTO: 20 %
MCH RBC QN AUTO: 28.1 PG (ref 26.5–33)
MCHC RBC AUTO-ENTMCNC: 28.4 G/DL (ref 31.5–36.5)
MCV RBC AUTO: 99 FL (ref 78–100)
MONOCYTES # BLD AUTO: 0.8 10E9/L (ref 0–1.3)
MONOCYTES NFR BLD AUTO: 11 %
NEUTROPHILS # BLD AUTO: 4.9 10E9/L (ref 1.6–8.3)
NEUTROPHILS NFR BLD AUTO: 65 %
PHOSPHATE SERPL-MCNC: 2.6 MG/DL (ref 2.5–4.5)
PLATELET # BLD AUTO: 313 10E9/L (ref 150–450)
PLATELET # BLD EST: ABNORMAL 10*3/UL
POLYCHROMASIA BLD QL SMEAR: ABNORMAL
POTASSIUM SERPL-SCNC: 3.5 MMOL/L (ref 3.4–5.3)
POTASSIUM SERPL-SCNC: 4.2 MMOL/L (ref 3.4–5.3)
RBC # BLD AUTO: 3.38 10E12/L (ref 3.8–5.2)
SODIUM SERPL-SCNC: 138 MMOL/L (ref 133–144)
SODIUM SERPL-SCNC: 139 MMOL/L (ref 133–144)
VARIANT LYMPHS BLD QL SMEAR: PRESENT
WBC # BLD AUTO: 7.5 10E9/L (ref 4–11)

## 2019-01-01 PROCEDURE — 71046 X-RAY EXAM CHEST 2 VIEWS: CPT

## 2019-01-01 PROCEDURE — 80048 BASIC METABOLIC PNL TOTAL CA: CPT | Performed by: NURSE PRACTITIONER

## 2019-01-01 PROCEDURE — 99214 OFFICE O/P EST MOD 30 MIN: CPT | Performed by: FAMILY MEDICINE

## 2019-01-01 PROCEDURE — 99214 OFFICE O/P EST MOD 30 MIN: CPT | Performed by: NURSE PRACTITIONER

## 2019-01-01 PROCEDURE — 36415 COLL VENOUS BLD VENIPUNCTURE: CPT | Performed by: INTERNAL MEDICINE

## 2019-01-01 PROCEDURE — 99214 OFFICE O/P EST MOD 30 MIN: CPT | Performed by: INTERNAL MEDICINE

## 2019-01-01 PROCEDURE — 80069 RENAL FUNCTION PANEL: CPT | Performed by: INTERNAL MEDICINE

## 2019-01-01 PROCEDURE — 85025 COMPLETE CBC W/AUTO DIFF WBC: CPT | Performed by: NURSE PRACTITIONER

## 2019-01-01 PROCEDURE — 36415 COLL VENOUS BLD VENIPUNCTURE: CPT | Performed by: NURSE PRACTITIONER

## 2019-01-01 RX ORDER — TORSEMIDE 10 MG/1
10 TABLET ORAL DAILY
Qty: 5 TABLET | Refills: 0 | Status: SHIPPED | OUTPATIENT
Start: 2019-01-01 | End: 2019-01-01

## 2019-01-01 RX ORDER — CETIRIZINE HYDROCHLORIDE 10 MG/1
10 TABLET ORAL DAILY
Qty: 90 TABLET | Refills: 1 | Status: SHIPPED | OUTPATIENT
Start: 2019-01-01 | End: 2020-01-01

## 2019-01-01 RX ORDER — DOXYCYCLINE 100 MG/1
100 CAPSULE ORAL 2 TIMES DAILY
Qty: 20 CAPSULE | Refills: 0 | Status: SHIPPED | OUTPATIENT
Start: 2019-01-01 | End: 2019-01-01

## 2019-01-01 RX ORDER — ACETAMINOPHEN 500 MG
500 TABLET ORAL EVERY 6 HOURS PRN
COMMUNITY
End: 2020-01-01

## 2019-01-01 RX ORDER — COLCHICINE 0.6 MG/1
0.6 TABLET ORAL DAILY
Qty: 3 TABLET | Refills: 0 | Status: SHIPPED | OUTPATIENT
Start: 2019-01-01 | End: 2019-01-01 | Stop reason: SINTOL

## 2019-01-01 RX ORDER — LISINOPRIL 5 MG/1
5 TABLET ORAL DAILY
Qty: 30 TABLET | Refills: 0 | Status: SHIPPED | OUTPATIENT
Start: 2019-01-01 | End: 2019-01-01

## 2019-01-01 RX ORDER — CETIRIZINE HYDROCHLORIDE 10 MG/1
10 TABLET ORAL 2 TIMES DAILY
Qty: 60 TABLET | Refills: 0 | Status: SHIPPED | OUTPATIENT
Start: 2019-01-01 | End: 2019-01-01

## 2019-01-01 RX ORDER — ALLOPURINOL 100 MG/1
200 TABLET ORAL DAILY
Qty: 60 TABLET | Refills: 0 | Status: SHIPPED | OUTPATIENT
Start: 2019-01-01 | End: 2020-01-01

## 2019-01-01 RX ORDER — LISINOPRIL 5 MG/1
5 TABLET ORAL DAILY
Qty: 30 TABLET | Refills: 0 | Status: SHIPPED | OUTPATIENT
Start: 2019-01-01 | End: 2020-01-01

## 2019-01-01 RX ORDER — ALLOPURINOL 100 MG/1
100 TABLET ORAL DAILY
Qty: 30 TABLET | Refills: 0 | Status: SHIPPED | OUTPATIENT
Start: 2019-01-01 | End: 2019-01-01

## 2019-01-01 RX ORDER — METOPROLOL TARTRATE 100 MG
100 TABLET ORAL DAILY
Qty: 90 TABLET | Refills: 1 | Status: SHIPPED | OUTPATIENT
Start: 2019-01-01 | End: 2020-01-01 | Stop reason: DRUGHIGH

## 2019-01-01 ASSESSMENT — ENCOUNTER SYMPTOMS
COUGH: 0
SINUS PRESSURE: 0
CHEST TIGHTNESS: 0
FATIGUE: 0
DIZZINESS: 0
COLOR CHANGE: 0
BACK PAIN: 0
RHINORRHEA: 0
SHORTNESS OF BREATH: 0
PSYCHIATRIC NEGATIVE: 1
ABDOMINAL DISTENTION: 0
APPETITE CHANGE: 0
EYES NEGATIVE: 1
ARTHRALGIAS: 0
PALPITATIONS: 0
SINUS PAIN: 0
APNEA: 0
WHEEZING: 0
NEUROLOGICAL NEGATIVE: 1
ENDOCRINE NEGATIVE: 1
SHORTNESS OF BREATH: 0
ACTIVITY CHANGE: 0
HEADACHES: 0
HEMATOLOGIC/LYMPHATIC NEGATIVE: 1
COUGH: 0
CHILLS: 0
STRIDOR: 0
SORE THROAT: 0
CHOKING: 0
FEVER: 0
JOINT SWELLING: 1
DIAPHORESIS: 0
ABDOMINAL PAIN: 0

## 2019-01-01 ASSESSMENT — PATIENT HEALTH QUESTIONNAIRE - PHQ9: SUM OF ALL RESPONSES TO PHQ QUESTIONS 1-9: 4

## 2019-01-02 ENCOUNTER — DOCUMENTATION ONLY (OUTPATIENT)
Dept: FAMILY MEDICINE | Facility: CLINIC | Age: 84
End: 2019-01-02

## 2019-01-02 DIAGNOSIS — I10 HYPERTENSION GOAL BP (BLOOD PRESSURE) < 140/90: Primary | ICD-10-CM

## 2019-01-02 DIAGNOSIS — E78.5 HYPERLIPIDEMIA LDL GOAL <130: ICD-10-CM

## 2019-01-02 DIAGNOSIS — D64.89 ANEMIA DUE TO OTHER CAUSE, NOT CLASSIFIED: ICD-10-CM

## 2019-01-02 DIAGNOSIS — N18.30 CKD (CHRONIC KIDNEY DISEASE) STAGE 3, GFR 30-59 ML/MIN (H): ICD-10-CM

## 2019-01-02 NOTE — PROGRESS NOTES
Please review lab orders sign and close encounter. Batsheva Roth MA/STACEY    Med check BP appt 1/10/19

## 2019-01-02 NOTE — PROGRESS NOTES
Please place or confirm pending lab orders for upcoming lab appointment on 1/4/19  Thank you,  Faye

## 2019-01-04 DIAGNOSIS — E78.5 HYPERLIPIDEMIA LDL GOAL <130: ICD-10-CM

## 2019-01-04 DIAGNOSIS — N18.30 CKD (CHRONIC KIDNEY DISEASE) STAGE 3, GFR 30-59 ML/MIN (H): ICD-10-CM

## 2019-01-04 DIAGNOSIS — I10 HYPERTENSION GOAL BP (BLOOD PRESSURE) < 140/90: ICD-10-CM

## 2019-01-04 LAB
ALBUMIN SERPL-MCNC: 3.3 G/DL (ref 3.4–5)
ANION GAP SERPL CALCULATED.3IONS-SCNC: 7 MMOL/L (ref 3–14)
BUN SERPL-MCNC: 40 MG/DL (ref 7–30)
CALCIUM SERPL-MCNC: 9.7 MG/DL (ref 8.5–10.1)
CHLORIDE SERPL-SCNC: 111 MMOL/L (ref 94–109)
CHOLEST SERPL-MCNC: 113 MG/DL
CO2 SERPL-SCNC: 24 MMOL/L (ref 20–32)
CREAT SERPL-MCNC: 1.3 MG/DL (ref 0.52–1.04)
GFR SERPL CREATININE-BSD FRML MDRD: 38 ML/MIN/{1.73_M2}
GLUCOSE SERPL-MCNC: 114 MG/DL (ref 70–99)
HDLC SERPL-MCNC: 44 MG/DL
LDLC SERPL CALC-MCNC: 42 MG/DL
NONHDLC SERPL-MCNC: 69 MG/DL
PHOSPHATE SERPL-MCNC: 3 MG/DL (ref 2.5–4.5)
POTASSIUM SERPL-SCNC: 4.8 MMOL/L (ref 3.4–5.3)
SODIUM SERPL-SCNC: 142 MMOL/L (ref 133–144)
TRIGL SERPL-MCNC: 136 MG/DL

## 2019-01-04 PROCEDURE — 36415 COLL VENOUS BLD VENIPUNCTURE: CPT | Performed by: FAMILY MEDICINE

## 2019-01-04 PROCEDURE — 80069 RENAL FUNCTION PANEL: CPT | Performed by: FAMILY MEDICINE

## 2019-01-04 PROCEDURE — 80061 LIPID PANEL: CPT | Performed by: FAMILY MEDICINE

## 2019-01-10 ENCOUNTER — OFFICE VISIT (OUTPATIENT)
Dept: FAMILY MEDICINE | Facility: CLINIC | Age: 84
End: 2019-01-10
Payer: MEDICARE

## 2019-01-10 ENCOUNTER — TELEPHONE (OUTPATIENT)
Dept: FAMILY MEDICINE | Facility: CLINIC | Age: 84
End: 2019-01-10

## 2019-01-10 VITALS
RESPIRATION RATE: 16 BRPM | DIASTOLIC BLOOD PRESSURE: 74 MMHG | HEIGHT: 59 IN | SYSTOLIC BLOOD PRESSURE: 129 MMHG | WEIGHT: 140 LBS | HEART RATE: 106 BPM | TEMPERATURE: 98.5 F | BODY MASS INDEX: 28.22 KG/M2 | OXYGEN SATURATION: 100 %

## 2019-01-10 DIAGNOSIS — M10.00 IDIOPATHIC GOUT, UNSPECIFIED CHRONICITY, UNSPECIFIED SITE: ICD-10-CM

## 2019-01-10 DIAGNOSIS — I10 HYPERTENSION GOAL BP (BLOOD PRESSURE) < 140/90: ICD-10-CM

## 2019-01-10 DIAGNOSIS — E78.5 HYPERLIPIDEMIA LDL GOAL <130: ICD-10-CM

## 2019-01-10 PROCEDURE — 99214 OFFICE O/P EST MOD 30 MIN: CPT | Performed by: FAMILY MEDICINE

## 2019-01-10 RX ORDER — ATENOLOL 50 MG/1
50 TABLET ORAL 2 TIMES DAILY
Qty: 180 TABLET | Refills: 1 | Status: SHIPPED | OUTPATIENT
Start: 2019-01-10 | End: 2019-07-17

## 2019-01-10 RX ORDER — ALLOPURINOL 100 MG/1
100 TABLET ORAL DAILY
Qty: 90 TABLET | Refills: 1 | Status: SHIPPED | OUTPATIENT
Start: 2019-01-10 | End: 2019-08-01

## 2019-01-10 RX ORDER — SIMVASTATIN 10 MG
TABLET ORAL
Qty: 90 TABLET | Refills: 1 | Status: SHIPPED | OUTPATIENT
Start: 2019-01-10 | End: 2019-01-10

## 2019-01-10 RX ORDER — LISINOPRIL AND HYDROCHLOROTHIAZIDE 20; 25 MG/1; MG/1
TABLET ORAL
Qty: 45 TABLET | Refills: 1 | Status: SHIPPED | OUTPATIENT
Start: 2019-01-10 | End: 2019-07-17

## 2019-01-10 RX ORDER — SIMVASTATIN 10 MG
TABLET ORAL
Qty: 90 TABLET | Refills: 1 | Status: SHIPPED | OUTPATIENT
Start: 2019-01-10 | End: 2019-07-17

## 2019-01-10 ASSESSMENT — MIFFLIN-ST. JEOR: SCORE: 1005.67

## 2019-01-10 NOTE — NURSING NOTE
"Chief Complaint   Patient presents with     Hypertension       Initial /74   Pulse 106   Temp 98.5  F (36.9  C) (Oral)   Resp 16   Ht 1.499 m (4' 11\")   Wt 63.5 kg (140 lb)   SpO2 100%   BMI 28.28 kg/m   Estimated body mass index is 28.28 kg/m  as calculated from the following:    Height as of this encounter: 1.499 m (4' 11\").    Weight as of this encounter: 63.5 kg (140 lb).    Savi Hamilton CMA      "

## 2019-01-10 NOTE — PROGRESS NOTES
"SUBJECTIVE:  Nan Doran, a 83 year old female scheduled an appointment to discuss the following issues:  Follow-up htn, CRI, hyperglycemia, high cholesterol and gout.  Not watching diet recently - holidays. Rare pop. White bread. No chest pain or shortness of breath. Limited exercise currently in winter.   No urine changes or hematuria. Emotionally doing. Lives with son and family.   Last gout attacks 7-8 months.   Past Medical History:   Diagnosis Date     Gout      HTN      Hypercholesteremia      Hyperglycemia        Past Surgical History:   Procedure Laterality Date     GYN SURGERY      tubal     HC DUPLEX SCAN EXTRACRANIAL, LIMITED UNILATERAL      normal carotid ultrasound 2006     HEAD & NECK SURGERY      tonsils       Family History   Problem Relation Age of Onset     Cerebrovascular Disease Father      Diabetes Paternal Grandmother      Blood Disease Daughter         lupus     Blood Disease Daughter         lupus       Social History     Tobacco Use     Smoking status: Former Smoker     Last attempt to quit: 1984     Years since quittin.0     Smokeless tobacco: Never Used   Substance Use Topics     Alcohol use: No       ROS:  All other ROS negative.     OBJECTIVE:  /74   Pulse 106   Temp 98.5  F (36.9  C) (Oral)   Resp 16   Ht 1.499 m (4' 11\")   Wt 63.5 kg (140 lb)   SpO2 100%   BMI 28.28 kg/m    EXAM:  GENERAL APPEARANCE: healthy, alert and no distress  NECK: no adenopathy, no asymmetry, masses, or scars and thyroid normal to palpation  RESP: lungs clear to auscultation - no rales, rhonchi or wheezes  CV: regular rates and rhythm, normal S1 S2, no S3 or S4 and no murmur, click or rub -  ABDOMEN:  soft, nontender, no HSM or masses and bowel sounds normal  MS: extremities normal- no gross deformities noted, no evidence of inflammation in joints, FROM in all extremities.  PSYCH: mentation appears normal and affect normal/bright    ASSESSMENT / PLAN:  (M10.00) Idiopathic gout, " unspecified chronicity, unspecified site  Comment: stable  Plan: allopurinol (ZYLOPRIM) 100 MG tablet        More water. Reveiwed risks and side effects of medication      (I10) Hypertension goal BP (blood pressure) < 140/90  Comment: stable  Plan: atenolol (TENORMIN) 50 MG tablet,         lisinopril-hydrochlorothiazide         (PRINZIDE/ZESTORETIC) 20-25 MG tablet        Self-monitor/exercise. Chest pain or shortness of breath to er. Recheck in 6 months  More water. Lower simple carbs    (E78.5) Hyperlipidemia LDL goal <130  Comment: stable  Plan: simvastatin (ZOCOR) 10 MG tablet        Patient would like one whole pill every other day.     Naga Russell

## 2019-01-10 NOTE — TELEPHONE ENCOUNTER
Nella calling from pharmacy needs clarification on directions for simvastatin. Needs to state how many tablets per dose. Please call to advise or re send new script. Thank you

## 2019-06-10 LAB — EJECTION FRACTION: 38

## 2019-07-01 ENCOUNTER — TRANSFERRED RECORDS (OUTPATIENT)
Dept: HEALTH INFORMATION MANAGEMENT | Facility: CLINIC | Age: 84
End: 2019-07-01

## 2019-07-06 ENCOUNTER — TRANSFERRED RECORDS (OUTPATIENT)
Dept: MULTI SPECIALTY CLINIC | Facility: CLINIC | Age: 84
End: 2019-07-06

## 2019-07-06 LAB
ALBUMIN SERPL-MCNC: 3.7 G/DL (ref 3.2–4.6)
ALP SERPL-CCNC: 261 IU/L (ref 50–136)
ALT SERPL-CCNC: 30 IU/L (ref 8–45)
AST SERPL-CCNC: 37 IU/L (ref 2–40)
BILIRUB SERPL-MCNC: 0.4 MG/DL (ref 0.2–1.2)
BILIRUBIN DIRECT: 0.3 MG/DL (ref 0.1–0.5)
INR PPP: 1.7
PROT SERPL-MCNC: 8.4 G/DL (ref 6–8)

## 2019-07-10 LAB
ERYTHROCYTE [DISTWIDTH] IN BLOOD BY AUTOMATED COUNT: 15.3 % (ref 11.5–15.5)
HCT VFR BLD AUTO: 27.5 % (ref 33–51)
HEMOGLOBIN: 8.7 G/DL (ref 12–16)
MCH RBC QN AUTO: 30 PG (ref 26–34)
MCHC RBC AUTO-ENTMCNC: 31.6 G/DL (ref 32–36)
MCV RBC AUTO: 95 FL (ref 80–100)
PLATELET # BLD AUTO: 252 THOU/CU MM (ref 140–440)
RBC # BLD AUTO: 2.9 MIL/CU MM (ref 4–5.2)
TSH SERPL-ACNC: 1.06 UIU/ML (ref 0.35–4.94)
WBC # BLD AUTO: 14.6 THOU/CU MM (ref 4.5–11)

## 2019-07-11 LAB
HEMOGLOBIN: 8.9 G/DL (ref 12–16)
WBC # BLD AUTO: 13.3 THOU/CU MM (ref 4.5–11)

## 2019-07-12 ENCOUNTER — RECORDS - HEALTHEAST (OUTPATIENT)
Dept: LAB | Facility: CLINIC | Age: 84
End: 2019-07-12

## 2019-07-12 LAB
ANION GAP SERPL CALCULATED.3IONS-SCNC: 9 MMOL/L (ref 5–18)
BUN SERPL-MCNC: 39 MG/DL (ref 8–25)
CALCIUM SERPL-MCNC: 9.8 MG/DL (ref 8.5–10.5)
CHLORIDE SERPLBLD-SCNC: 98 MMOL/L (ref 98–110)
CO2 SERPL-SCNC: 29 MMOL/L (ref 21–31)
CREAT SERPL-MCNC: 0.83 MG/DL (ref 0.57–1.11)
GFR SERPL CREATININE-BSD FRML MDRD: >60 ML/MIN/1.73M2
GLUCOSE SERPL-MCNC: 94 MG/DL (ref 65–100)
POTASSIUM SERPL-SCNC: 4.9 MMOL/L (ref 3.5–5)
SODIUM SERPL-SCNC: 136 MMOL/L (ref 135–145)

## 2019-07-15 ENCOUNTER — NURSING HOME VISIT (OUTPATIENT)
Dept: GERIATRICS | Facility: CLINIC | Age: 84
End: 2019-07-15
Payer: MEDICARE

## 2019-07-15 VITALS
RESPIRATION RATE: 18 BRPM | HEART RATE: 68 BPM | BODY MASS INDEX: 25.45 KG/M2 | DIASTOLIC BLOOD PRESSURE: 59 MMHG | OXYGEN SATURATION: 96 % | SYSTOLIC BLOOD PRESSURE: 107 MMHG | TEMPERATURE: 97.3 F | WEIGHT: 126 LBS

## 2019-07-15 DIAGNOSIS — I48.20 CHRONIC ATRIAL FIBRILLATION (H): ICD-10-CM

## 2019-07-15 DIAGNOSIS — N18.30 CKD (CHRONIC KIDNEY DISEASE) STAGE 3, GFR 30-59 ML/MIN (H): ICD-10-CM

## 2019-07-15 DIAGNOSIS — R53.1 LEFT-SIDED WEAKNESS: Primary | ICD-10-CM

## 2019-07-15 DIAGNOSIS — I50.9 CONGESTIVE HEART FAILURE, UNSPECIFIED HF CHRONICITY, UNSPECIFIED HEART FAILURE TYPE (H): ICD-10-CM

## 2019-07-15 DIAGNOSIS — M10.00 IDIOPATHIC GOUT, UNSPECIFIED CHRONICITY, UNSPECIFIED SITE: ICD-10-CM

## 2019-07-15 DIAGNOSIS — I10 HYPERTENSION GOAL BP (BLOOD PRESSURE) < 140/90: ICD-10-CM

## 2019-07-15 DIAGNOSIS — Z86.73 HISTORY OF CVA (CEREBROVASCULAR ACCIDENT): ICD-10-CM

## 2019-07-15 PROBLEM — Z79.01 CHRONIC ANTICOAGULATION: Status: ACTIVE | Noted: 2019-06-10

## 2019-07-15 PROBLEM — I48.91 ATRIAL FIBRILLATION, NEW ONSET (H): Status: ACTIVE | Noted: 2019-06-09

## 2019-07-15 PROBLEM — N17.9 ACUTE KIDNEY INJURY SUPERIMPOSED ON CHRONIC KIDNEY DISEASE (H): Status: ACTIVE | Noted: 2019-07-06

## 2019-07-15 PROBLEM — M1A.9XX1 TOPHACEOUS GOUT: Status: ACTIVE | Noted: 2019-07-09

## 2019-07-15 PROBLEM — R65.20 SEVERE SEPSIS (H): Status: ACTIVE | Noted: 2019-07-06

## 2019-07-15 PROBLEM — S82.892A CLOSED FRACTURE OF LEFT ANKLE: Status: ACTIVE | Noted: 2019-07-15

## 2019-07-15 PROBLEM — A41.9 SEVERE SEPSIS (H): Status: ACTIVE | Noted: 2019-07-06

## 2019-07-15 PROBLEM — N18.9 ACUTE KIDNEY INJURY SUPERIMPOSED ON CHRONIC KIDNEY DISEASE (H): Status: ACTIVE | Noted: 2019-07-06

## 2019-07-15 PROBLEM — S82.53XA: Status: ACTIVE | Noted: 2019-06-10

## 2019-07-15 PROBLEM — I63.411: Status: ACTIVE | Noted: 2019-06-10

## 2019-07-15 PROBLEM — E87.20 METABOLIC ACIDOSIS: Status: ACTIVE | Noted: 2019-07-06

## 2019-07-15 PROBLEM — R19.7 DIARRHEA: Status: ACTIVE | Noted: 2019-07-10

## 2019-07-15 PROBLEM — G93.41 ACUTE METABOLIC ENCEPHALOPATHY: Status: ACTIVE | Noted: 2019-07-06

## 2019-07-15 LAB
ANION GAP SERPL CALCULATED.3IONS-SCNC: 5 MMOL/L (ref 5–18)
BUN SERPL-MCNC: 50 MG/DL (ref 8–28)
CALCIUM SERPL-MCNC: 9 MG/DL (ref 8.5–10.5)
CHLORIDE BLD-SCNC: 103 MMOL/L (ref 98–107)
CO2 SERPL-SCNC: 31 MMOL/L (ref 22–31)
CREAT SERPL-MCNC: 1.04 MG/DL (ref 0.6–1.1)
GFR SERPL CREATININE-BSD FRML MDRD: 51 ML/MIN/1.73M2
GLUCOSE BLD-MCNC: 94 MG/DL (ref 70–125)
POTASSIUM BLD-SCNC: 4.9 MMOL/L (ref 3.5–5)
SODIUM SERPL-SCNC: 139 MMOL/L (ref 136–145)

## 2019-07-15 PROCEDURE — 99309 SBSQ NF CARE MODERATE MDM 30: CPT | Performed by: NURSE PRACTITIONER

## 2019-07-15 PROCEDURE — 99207 ZZC CDG-MDM COMPONENT: MEETS LOW - DOWN CODED: CPT | Performed by: NURSE PRACTITIONER

## 2019-07-15 RX ORDER — ACETAMINOPHEN 650 MG/20.3ML
20.3 LIQUID ORAL 3 TIMES DAILY
COMMUNITY
End: 2019-01-01

## 2019-07-15 RX ORDER — SENNOSIDES 8.6 MG
1 TABLET ORAL DAILY PRN
COMMUNITY
End: 2019-07-30 | Stop reason: ALTCHOICE

## 2019-07-15 RX ORDER — CALCIUM CARBONATE 500 MG/1
1 TABLET, CHEWABLE ORAL EVERY 4 HOURS PRN
COMMUNITY

## 2019-07-15 RX ORDER — METOPROLOL TARTRATE 75 MG/1
75 TABLET, FILM COATED ORAL 2 TIMES DAILY
COMMUNITY
End: 2019-07-30 | Stop reason: ALTCHOICE

## 2019-07-15 RX ORDER — SIMETHICONE 80 MG
80 TABLET,CHEWABLE ORAL 4 TIMES DAILY PRN
COMMUNITY
End: 2019-01-01

## 2019-07-15 RX ORDER — METHOCARBAMOL 500 MG/1
250 TABLET, FILM COATED ORAL EVERY 6 HOURS PRN
COMMUNITY
End: 2019-07-17

## 2019-07-15 RX ORDER — SIMVASTATIN 20 MG
20 TABLET ORAL AT BEDTIME
COMMUNITY
End: 2019-08-12

## 2019-07-15 RX ORDER — MIRTAZAPINE 7.5 MG/1
7.5 TABLET, FILM COATED ORAL AT BEDTIME
COMMUNITY
End: 2019-08-12
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
## 2019-07-17 PROBLEM — I48.91 ATRIAL FIBRILLATION, NEW ONSET (H): Status: RESOLVED | Noted: 2019-06-09 | Resolved: 2019-07-17

## 2019-07-17 PROBLEM — R65.20 SEVERE SEPSIS (H): Status: RESOLVED | Noted: 2019-07-06 | Resolved: 2019-07-17

## 2019-07-17 PROBLEM — G93.41 ACUTE METABOLIC ENCEPHALOPATHY: Status: RESOLVED | Noted: 2019-07-06 | Resolved: 2019-07-17

## 2019-07-17 PROBLEM — R53.1 LEFT-SIDED WEAKNESS: Status: ACTIVE | Noted: 2019-07-17

## 2019-07-17 PROBLEM — I63.411: Status: RESOLVED | Noted: 2019-06-10 | Resolved: 2019-07-17

## 2019-07-17 PROBLEM — Z86.73 HISTORY OF CVA (CEREBROVASCULAR ACCIDENT): Status: ACTIVE | Noted: 2019-07-17

## 2019-07-17 PROBLEM — A41.9 SEVERE SEPSIS (H): Status: RESOLVED | Noted: 2019-07-06 | Resolved: 2019-07-17

## 2019-07-24 ENCOUNTER — NURSING HOME VISIT (OUTPATIENT)
Dept: GERIATRICS | Facility: CLINIC | Age: 84
End: 2019-07-24
Payer: MEDICARE

## 2019-07-24 VITALS
HEART RATE: 100 BPM | BODY MASS INDEX: 26.34 KG/M2 | WEIGHT: 130.4 LBS | SYSTOLIC BLOOD PRESSURE: 125 MMHG | OXYGEN SATURATION: 95 % | DIASTOLIC BLOOD PRESSURE: 77 MMHG | TEMPERATURE: 97.4 F | RESPIRATION RATE: 18 BRPM

## 2019-07-24 DIAGNOSIS — I48.20 CHRONIC ATRIAL FIBRILLATION (H): ICD-10-CM

## 2019-07-24 DIAGNOSIS — N18.30 CKD (CHRONIC KIDNEY DISEASE) STAGE 3, GFR 30-59 ML/MIN (H): ICD-10-CM

## 2019-07-24 DIAGNOSIS — M10.00 IDIOPATHIC GOUT, UNSPECIFIED CHRONICITY, UNSPECIFIED SITE: ICD-10-CM

## 2019-07-24 DIAGNOSIS — Z86.73 HISTORY OF CVA (CEREBROVASCULAR ACCIDENT): ICD-10-CM

## 2019-07-24 PROCEDURE — 99305 1ST NF CARE MODERATE MDM 35: CPT | Performed by: FAMILY MEDICINE

## 2019-07-24 RX ORDER — METOPROLOL TARTRATE 100 MG
125 TABLET ORAL 2 TIMES DAILY
COMMUNITY
End: 2019-08-01

## 2019-07-29 ENCOUNTER — RECORDS - HEALTHEAST (OUTPATIENT)
Dept: LAB | Facility: CLINIC | Age: 84
End: 2019-07-29

## 2019-07-29 ENCOUNTER — TRANSFERRED RECORDS (OUTPATIENT)
Dept: HEALTH INFORMATION MANAGEMENT | Facility: CLINIC | Age: 84
End: 2019-07-29

## 2019-07-29 ENCOUNTER — NURSING HOME VISIT (OUTPATIENT)
Dept: GERIATRICS | Facility: CLINIC | Age: 84
End: 2019-07-29
Payer: MEDICARE

## 2019-07-29 VITALS
SYSTOLIC BLOOD PRESSURE: 130 MMHG | DIASTOLIC BLOOD PRESSURE: 80 MMHG | BODY MASS INDEX: 26.7 KG/M2 | WEIGHT: 132.2 LBS | OXYGEN SATURATION: 96 % | TEMPERATURE: 98.7 F | HEART RATE: 118 BPM | RESPIRATION RATE: 16 BRPM

## 2019-07-29 DIAGNOSIS — M10.9 ACUTE GOUT OF MULTIPLE SITES, UNSPECIFIED CAUSE: Primary | ICD-10-CM

## 2019-07-29 DIAGNOSIS — I48.91 ATRIAL FIBRILLATION, UNSPECIFIED TYPE (H): ICD-10-CM

## 2019-07-29 DIAGNOSIS — N18.30 CKD (CHRONIC KIDNEY DISEASE) STAGE 3, GFR 30-59 ML/MIN (H): ICD-10-CM

## 2019-07-29 PROCEDURE — 99309 SBSQ NF CARE MODERATE MDM 30: CPT | Performed by: NURSE PRACTITIONER

## 2019-07-29 RX ORDER — METOPROLOL TARTRATE 100 MG
100 TABLET ORAL 2 TIMES DAILY
COMMUNITY
End: 2019-07-30

## 2019-07-29 NOTE — LETTER
7/29/2019        RE: Nan Doran  [address] [facility] [address]        [facility]  [place] Medical Record Number:  [id number]  Place of Service where encounter took place:  [facility] ([facility]) [[id number]]  Chief Complaint   Patient presents with     Nursing Home Acute     HPI:    Nan Doran  is a 84 year old (1935), who is being seen today for an episodic care visit.  HPI information obtained from: facility chart records, facility staff, patient report, [facility] chart review and family/first contact daughter report. Today's concern is:    Gout. Per staff report, patient noted to have swollen and painful right wrist. Requesting she be seen. Today, daughter is present during visit. Patient reports her right wrist became painful on Thursday or Friday of last week. Questioned if she put too much weight on it in therapy. On Friday or Saturday, noted to have swelling in wrist and hand. Feels like a bad toothache. Yelps when wrist is touched. Has been using Tylenol with minimal relief for pain. Denies injury to the site. Has small, pinpoint open areas and scabs present on finger tips of right hand. States she gets these on occasion. Scab over and go away. Denies drainage. States she's always had gout in in ankle and toes, never in her wrist. Uses Colchicine at home with relief.    Chronic Kidney Disease Stage III. Limited data available to review over the past year. Last Creatinine 0.83 on 7/12/19.     Atrial Fibrillation. Denies chest pain. Upon review of heart rate over the past 5 days, range is from , most low 100s. Metoprolol increased on 7/24/19. Since that time, heart rate     Past Medical and Surgical History reviewed in Epic today.    MEDICATIONS:  Current Outpatient Medications   Medication Sig Dispense Refill     Acetaminophen 650 MG/20.3ML SOLN Take 20.3 mLs by mouth 3 times daily Also TID PRN       apixaban ANTICOAGULANT (ELIQUIS) 2.5 MG tablet Take  2.5 mg by mouth 2 times daily       calcium carbonate (TUMS) 500 MG chewable tablet Take 1 chew tab by mouth every 4 hours as needed for heartburn       colchicine (COLCYRS) 0.6 MG tablet Take 0.6 mg by mouth 2 times daily       metoprolol tartrate (LOPRESSOR) 100 MG tablet Take 100 mg by mouth 2 times daily        mirtazapine (REMERON) 7.5 MG tablet Take 7.5 mg by mouth At Bedtime       senna-docusate (SENOKOT-S/PERICOLACE) 8.6-50 MG tablet Take 1 tablet by mouth daily as needed for constipation       simethicone (MYLICON) 80 MG chewable tablet Take 80 mg by mouth 4 times daily as needed for flatulence or cramping       simvastatin (ZOCOR) 20 MG tablet Take 20 mg by mouth At Bedtime       allopurinol (ZYLOPRIM) 100 MG tablet Take 1 tablet (100 mg) by mouth daily New to help prevent gout attacks (Patient not taking: Reported on 7/30/2019) 90 tablet 1     REVIEW OF SYSTEMS:  4 point ROS including Respiratory, CV, GI and , other than that noted in the HPI,  is negative    Objective:  /80   Pulse 118   Temp 98.7  F (37.1  C)   Resp 16   Wt 60 kg (132 lb 3.2 oz)   SpO2 96%   BMI 26.70 kg/m     Exam:  GENERAL APPEARANCE:  Alert, in no distress  ENT:  Mouth and posterior oropharynx normal, moist mucous membranes  EYES:  EOM, conjunctivae, lids, pupils and irises normal  RESP:  respiratory effort and palpation of chest normal, lungs clear to auscultation , no respiratory distress  CV:  Palpation and auscultation of heart done , regular rate and rhythm, no murmur, rub, or gallop  ABDOMEN:  normal bowel sounds, soft, nontender, no hepatosplenomegaly or other masses  M/S:   Edema in right hand and wrist. Slight erythema. Tender to touch.   SKIN:  Tophi with skin ulceration over some of the distal aspect of fingers and thumb on right hand.   NEURO:   Cranial nerves 2-12 are normal tested and grossly at patient's baseline  PSYCH:  affect and mood normal    Labs:   Labs done in SNF are in [place] EPIC. Please  refer to them using EPIC/Care Everywhere.    ASSESSMENT/PLAN:  Acute Tophaceous Gout. Takes Allopurinol preventatively. Treated for gout flare in toes during hospitalization with Indomethacin with last dose 7/19/19. Uric Acid level 9 on 7/10/19. Based on symptoms in right hand and wrist, will order Colchicine 1.2 mg PO x 1 followed by 0.6 mg PO x 1 today although start of symptoms may have been outside of 36 hour window. Will order Colchicine 0.6 mg PO BID on 7/30/19 and 7/31/19. Will follow-up on 8/1/19 to determine if further treatment is indicated. Will hold Allopurinol while on Colchicine. Will also order x-ray of right hand and wrist to rule out acute findings. Allopurinol and CBC with differential to be drawn on 7/30/19. Further plans pending results. ADDENDUM: Right hand and wrist x-ray with no acute findings.      Chronic Kidney Disease Stage III. Limited data over the past year, but most recent baseline Creatinine ~ 1.1-1.3. Developed KATHY during hospitalization. Last Creatinine 0.83 on 7/12/19. Will order BMP on 7/30/19 to ensure stability, particularly while taking Colchicine.     Atrial Fibrillation.  New during most recent hospitalization. Noted to have episodes of tachycardia since TCU admission. Question if vitals machine is picking up on abnormal heart rhythm of atrial fibrillation. Metoprolol increased on 7/24/19. Monitor heart rate daily. Also monitor blood pressure on increased dose of Metoprolol. If continued episodes of tachycardia, will likely need increase in Metoprolol. Question if pain is also causing increase in heart rate. Continue Apixaban as ordered.    Electronically signed by:  LOVE Begum CNP             Sincerely,        LOVE Begum CNP

## 2019-07-29 NOTE — PROGRESS NOTES
[facility]  [place] [facility] Record Number:  [id number]  Place of Service where encounter took place:  [facility] (S) [[id number]]  Chief Complaint   Patient presents with     Nursing Home Acute     HPI:    Nan Doran  is a 84 year old (1935), who is being seen today for an episodic care visit.  HPI information obtained from: facility chart records, facility staff, patient report, [facility] chart review and family/first contact daughter report. Today's concern is:    Gout. Per staff report, patient noted to have swollen and painful right wrist. Requesting she be seen. Today, daughter is present during visit. Patient reports her right wrist became painful on Thursday or Friday of last week. Questioned if she put too much weight on it in therapy. On Friday or Saturday, noted to have swelling in wrist and hand. Feels like a bad toothache. Yelps when wrist is touched. Has been using Tylenol with minimal relief for pain. Denies injury to the site. Has small, pinpoint open areas and scabs present on finger tips of right hand. States she gets these on occasion. Scab over and go away. Denies drainage. States she's always had gout in in ankle and toes, never in her wrist. Uses Colchicine at home with relief.    Chronic Kidney Disease Stage III. Limited data available to review over the past year. Last Creatinine 0.83 on 7/12/19.     Atrial Fibrillation. Denies chest pain. Upon review of heart rate over the past 5 days, range is from , most low 100s. Metoprolol increased on 7/24/19. Since that time, heart rate     Past Medical and Surgical History reviewed in Epic today.    MEDICATIONS:  Current Outpatient Medications   Medication Sig Dispense Refill     Acetaminophen 650 MG/20.3ML SOLN Take 20.3 mLs by mouth 3 times daily Also TID PRN       apixaban ANTICOAGULANT (ELIQUIS) 2.5 MG tablet Take 2.5 mg by mouth 2 times daily       calcium carbonate (TUMS) 500 MG chewable tablet Take  1 chew tab by mouth every 4 hours as needed for heartburn       colchicine (COLCYRS) 0.6 MG tablet Take 0.6 mg by mouth 2 times daily       metoprolol tartrate (LOPRESSOR) 100 MG tablet Take 100 mg by mouth 2 times daily        mirtazapine (REMERON) 7.5 MG tablet Take 7.5 mg by mouth At Bedtime       senna-docusate (SENOKOT-S/PERICOLACE) 8.6-50 MG tablet Take 1 tablet by mouth daily as needed for constipation       simethicone (MYLICON) 80 MG chewable tablet Take 80 mg by mouth 4 times daily as needed for flatulence or cramping       simvastatin (ZOCOR) 20 MG tablet Take 20 mg by mouth At Bedtime       allopurinol (ZYLOPRIM) 100 MG tablet Take 1 tablet (100 mg) by mouth daily New to help prevent gout attacks (Patient not taking: Reported on 7/30/2019) 90 tablet 1     REVIEW OF SYSTEMS:  4 point ROS including Respiratory, CV, GI and , other than that noted in the HPI,  is negative    Objective:  /80   Pulse 118   Temp 98.7  F (37.1  C)   Resp 16   Wt 60 kg (132 lb 3.2 oz)   SpO2 96%   BMI 26.70 kg/m    Exam:  GENERAL APPEARANCE:  Alert, in no distress  ENT:  Mouth and posterior oropharynx normal, moist mucous membranes  EYES:  EOM, conjunctivae, lids, pupils and irises normal  RESP:  respiratory effort and palpation of chest normal, lungs clear to auscultation , no respiratory distress  CV:  Palpation and auscultation of heart done , regular rate and rhythm, no murmur, rub, or gallop  ABDOMEN:  normal bowel sounds, soft, nontender, no hepatosplenomegaly or other masses  M/S:   Edema in right hand and wrist. Slight erythema. Tender to touch.   SKIN:  Tophi with skin ulceration over some of the distal aspect of fingers and thumb on right hand.   NEURO:   Cranial nerves 2-12 are normal tested and grossly at patient's baseline  PSYCH:  affect and mood normal    Labs:   Labs done in SNF are in [place] EPIC. Please refer to them using EPIC/Care Everywhere.    ASSESSMENT/PLAN:  Acute Tophaceous Gout. Takes  Allopurinol preventatively. Treated for gout flare in toes during hospitalization with Indomethacin with last dose 7/19/19. Uric Acid level 9 on 7/10/19. Based on symptoms in right hand and wrist, will order Colchicine 1.2 mg PO x 1 followed by 0.6 mg PO x 1 today although start of symptoms may have been outside of 36 hour window. Will order Colchicine 0.6 mg PO BID on 7/30/19 and 7/31/19. Will follow-up on 8/1/19 to determine if further treatment is indicated. Will hold Allopurinol while on Colchicine. Will also order x-ray of right hand and wrist to rule out acute findings. Allopurinol and CBC with differential to be drawn on 7/30/19. Further plans pending results. ADDENDUM: Right hand and wrist x-ray with no acute findings.      Chronic Kidney Disease Stage III. Limited data over the past year, but most recent baseline Creatinine ~ 1.1-1.3. Developed KATHY during hospitalization. Last Creatinine 0.83 on 7/12/19. Will order BMP on 7/30/19 to ensure stability, particularly while taking Colchicine.     Atrial Fibrillation.  New during most recent hospitalization. Noted to have episodes of tachycardia since TCU admission. Question if vitals machine is picking up on abnormal heart rhythm of atrial fibrillation. Metoprolol increased on 7/24/19. Monitor heart rate daily. Also monitor blood pressure on increased dose of Metoprolol. If continued episodes of tachycardia, will likely need increase in Metoprolol. Question if pain is also causing increase in heart rate. Continue Apixaban as ordered.    Electronically signed by:  LOVE Begum CNP

## 2019-07-30 ENCOUNTER — TRANSFERRED RECORDS (OUTPATIENT)
Dept: HEALTH INFORMATION MANAGEMENT | Facility: CLINIC | Age: 84
End: 2019-07-30

## 2019-07-30 ENCOUNTER — RECORDS - HEALTHEAST (OUTPATIENT)
Dept: LAB | Facility: CLINIC | Age: 84
End: 2019-07-30

## 2019-07-30 LAB
ANION GAP SERPL CALCULATED.3IONS-SCNC: 6 MMOL/L (ref 5–18)
ANION GAP SERPL CALCULATED.3IONS-SCNC: 6 MMOL/L (ref 5–18)
BASOPHILS # BLD AUTO: 0 THOU/UL (ref 0–0.2)
BASOPHILS NFR BLD AUTO: 1 % (ref 0–2)
BUN SERPL-MCNC: 19 MG/DL (ref 8–28)
BUN SERPL-MCNC: 19 MG/DL (ref 8–28)
CALCIUM SERPL-MCNC: 9.3 MG/DL (ref 8.5–10.5)
CALCIUM SERPL-MCNC: 9.3 MG/DL (ref 8.5–10.5)
CHLORIDE BLD-SCNC: 111 MMOL/L (ref 98–107)
CHLORIDE SERPLBLD-SCNC: 111 MMOL/L (ref 98–107)
CO2 SERPL-SCNC: 19 MMOL/L (ref 22–31)
CO2 SERPL-SCNC: 19 MMOL/L (ref 22–31)
CREAT SERPL-MCNC: 0.78 MG/DL (ref 0.6–1.1)
CREAT SERPL-MCNC: 0.78 MG/DL (ref 0.6–1.1)
DIFFERENTIAL: ABNORMAL
EOSINOPHIL # BLD AUTO: 0.2 THOU/UL (ref 0–0.4)
EOSINOPHIL NFR BLD AUTO: 3 % (ref 0–6)
ERYTHROCYTE [DISTWIDTH] IN BLOOD BY AUTOMATED COUNT: 17 % (ref 11–14.5)
ERYTHROCYTE [DISTWIDTH] IN BLOOD BY AUTOMATED COUNT: 17 % (ref 11–14.5)
GFR SERPL CREATININE-BSD FRML MDRD: >60 ML/MIN/1.73M2
GFR SERPL CREATININE-BSD FRML MDRD: >60 ML/MIN/1.73M2
GLUCOSE BLD-MCNC: 84 MG/DL (ref 70–125)
GLUCOSE SERPL-MCNC: 84 MG/DL (ref 70–125)
HCT VFR BLD AUTO: 24.7 % (ref 35–47)
HCT VFR BLD AUTO: 24.7 % (ref 35–47)
HEMOGLOBIN: 7.1 G/DL (ref 12–16)
HGB BLD-MCNC: 7.1 G/DL (ref 12–16)
LYMPHOCYTES # BLD AUTO: 1.8 THOU/UL (ref 0.8–4.4)
LYMPHOCYTES NFR BLD AUTO: 24 % (ref 20–40)
MCH RBC QN AUTO: 30.7 PG (ref 27–34)
MCH RBC QN AUTO: 30.7 PG (ref 27–34)
MCHC RBC AUTO-ENTMCNC: 28.7 G/DL (ref 32–36)
MCHC RBC AUTO-ENTMCNC: 28.7 G/DL (ref 32–36)
MCV RBC AUTO: 107 FL (ref 80–100)
MCV RBC AUTO: 107 FL (ref 80–100)
MONOCYTES # BLD AUTO: 0.8 THOU/UL (ref 0–0.9)
MONOCYTES NFR BLD AUTO: 11 % (ref 2–10)
NEUTROPHILS # BLD AUTO: 4.4 THOU/UL (ref 2–7.7)
NEUTROPHILS NFR BLD AUTO: 61 % (ref 50–70)
PLATELET # BLD AUTO: 316 THOU/UL (ref 140–440)
PLATELET # BLD AUTO: 316 THOU/UL (ref 140–440)
PMV BLD AUTO: 10 FL (ref 8.5–12.5)
POTASSIUM BLD-SCNC: 4.2 MMOL/L (ref 3.5–5)
POTASSIUM SERPL-SCNC: 4.2 MMOL/L (ref 3.5–5)
RBC # BLD AUTO: 2.31 MILL/UL (ref 3.8–5.4)
RBC # BLD AUTO: 2.31 MILL/UL (ref 3.8–5.4)
SODIUM SERPL-SCNC: 136 MMOL/L (ref 136–145)
SODIUM SERPL-SCNC: 136 MMOL/L (ref 136–145)
URATE SERPL-MCNC: 7.1 MG/DL (ref 2–7.5)
WBC # BLD AUTO: 7.3 THOU/UL (ref 4–11)
WBC: 7.3 THOU/UL (ref 4–11)

## 2019-07-30 RX ORDER — AMOXICILLIN 250 MG
1 CAPSULE ORAL DAILY PRN
COMMUNITY
End: 2019-01-01

## 2019-07-30 RX ORDER — COLCHICINE 0.6 MG/1
0.6 TABLET ORAL 2 TIMES DAILY
COMMUNITY
End: 2019-08-01

## 2019-07-31 ENCOUNTER — TRANSFERRED RECORDS (OUTPATIENT)
Dept: HEALTH INFORMATION MANAGEMENT | Facility: CLINIC | Age: 84
End: 2019-07-31

## 2019-07-31 LAB
FERRITIN SERPL-MCNC: 67 NG/ML (ref 10–130)
FOLATE SERPL-MCNC: 11.9 NG/ML
HEMOGLOBIN: 7.2 G/DL (ref 12–16)
HGB BLD-MCNC: 7.2 G/DL (ref 12–16)
IRON SATN MFR SERPL: 8 % (ref 20–50)
IRON SERPL-MCNC: 20 UG/DL (ref 42–175)
TIBC SERPL-MCNC: 244 UG/DL (ref 313–563)
TRANSFERRIN SERPL-MCNC: 195 MG/DL (ref 212–360)
VIT B12 SERPL-MCNC: 229 PG/ML (ref 213–816)

## 2019-08-01 ENCOUNTER — DISCHARGE SUMMARY NURSING HOME (OUTPATIENT)
Dept: GERIATRICS | Facility: CLINIC | Age: 84
End: 2019-08-01
Payer: MEDICARE

## 2019-08-01 VITALS
SYSTOLIC BLOOD PRESSURE: 123 MMHG | OXYGEN SATURATION: 98 % | WEIGHT: 131.8 LBS | HEART RATE: 116 BPM | RESPIRATION RATE: 16 BRPM | TEMPERATURE: 98.2 F | DIASTOLIC BLOOD PRESSURE: 74 MMHG | BODY MASS INDEX: 26.62 KG/M2

## 2019-08-01 DIAGNOSIS — I48.91 ATRIAL FIBRILLATION, UNSPECIFIED TYPE (H): ICD-10-CM

## 2019-08-01 DIAGNOSIS — I50.9 CONGESTIVE HEART FAILURE, UNSPECIFIED HF CHRONICITY, UNSPECIFIED HEART FAILURE TYPE (H): ICD-10-CM

## 2019-08-01 DIAGNOSIS — D64.9 ANEMIA, UNSPECIFIED TYPE: ICD-10-CM

## 2019-08-01 DIAGNOSIS — K59.00 CONSTIPATION, UNSPECIFIED CONSTIPATION TYPE: ICD-10-CM

## 2019-08-01 DIAGNOSIS — N18.30 CKD (CHRONIC KIDNEY DISEASE) STAGE 3, GFR 30-59 ML/MIN (H): ICD-10-CM

## 2019-08-01 DIAGNOSIS — I10 HYPERTENSION GOAL BP (BLOOD PRESSURE) < 140/90: ICD-10-CM

## 2019-08-01 DIAGNOSIS — R53.1 LEFT-SIDED WEAKNESS: Primary | ICD-10-CM

## 2019-08-01 DIAGNOSIS — F32.A DEPRESSION, UNSPECIFIED DEPRESSION TYPE: ICD-10-CM

## 2019-08-01 DIAGNOSIS — M10.00 IDIOPATHIC GOUT, UNSPECIFIED CHRONICITY, UNSPECIFIED SITE: ICD-10-CM

## 2019-08-01 DIAGNOSIS — Z86.73 HISTORY OF CVA (CEREBROVASCULAR ACCIDENT): ICD-10-CM

## 2019-08-01 PROCEDURE — 99316 NF DSCHRG MGMT 30 MIN+: CPT | Performed by: NURSE PRACTITIONER

## 2019-08-01 RX ORDER — ALLOPURINOL 100 MG/1
100 TABLET ORAL DAILY
Qty: 10 TABLET | Refills: 0 | Status: SHIPPED | OUTPATIENT
Start: 2019-08-01 | End: 2019-08-12

## 2019-08-01 RX ORDER — METOPROLOL TARTRATE 25 MG/1
125 TABLET, FILM COATED ORAL 2 TIMES DAILY
Qty: 70 TABLET | Refills: 0 | Status: SHIPPED | OUTPATIENT
Start: 2019-08-01 | End: 2019-08-12

## 2019-08-01 RX ORDER — FERROUS SULFATE 325(65) MG
325 TABLET ORAL
Qty: 10 TABLET | Refills: 0 | Status: SHIPPED | OUTPATIENT
Start: 2019-08-01 | End: 2019-08-12

## 2019-08-01 RX ORDER — FERROUS SULFATE 325(65) MG
325 TABLET ORAL
COMMUNITY
End: 2019-08-01
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
## 2019-08-01 NOTE — PROGRESS NOTES
[facility] DISCHARGE SUMMARY  PATIENT'S NAME: Nan Doran  YOB: 1935  MEDICAL RECORD NUMBER:  [id number]  Place of Service where encounter took place:  [facility] ([facility] [[id number]]    PRIMARY CARE PROVIDER AND CLINIC RESPONSIBLE AFTER TRANSFER:   Naga Russell MD, [address] [facility] / [facility] [address] ***   {[facility] GERIAT[facility]IC DISCHARGE DISPOSITION:508406}     Transferring providers: Sagrario Palacios MA, ***, MD  Recent Hospitalization/ED:  [facility]  stay 19 to 19.  Date of SNF Admission:   Date of SNF (anticipated) Discharge: {DATE/MONTH/YEAR:0538724}  Discharged to: {fgsdischargeto1:366193}  Cognitive Scores: {fgscog1:117683}  Physical Function: {fgsphysicalfunction:211787}  DME: {fgsdmedc:486630}    CODE STATUS/ADVANCE DIRECTIVES DISCUSSION:  Full Code {Provider, verify code status is accurate as an order in Epic}  ALLERGIES: Erythromycin    DISCHARGE DIAGNOSIS/NURSING FACILITY COURSE:   {fgsdia}    Past Medical History:  has a past medical history of Gout, HTN, Hypercholesteremia, and Hyperglycemia.    Discharge Medications:  Current Outpatient Medications   Medication Sig Dispense Refill     Acetaminophen 650 MG/20.3ML SOLN Take 20.3 mLs by mouth 3 times daily Also TID PRN       allopurinol (ZYLOPRIM) 100 MG tablet Take 1 tablet (100 mg) by mouth daily New to help prevent gout attacks 90 tablet 1     apixaban ANTICOAGULANT (ELIQUIS) 2.5 MG tablet Take 2.5 mg by mouth 2 times daily       calcium carbonate (TUMS) 500 MG chewable tablet Take 1 chew tab by mouth every 4 hours as needed for heartburn       colchicine (COLCYRS) 0.6 MG tablet Take 0.6 mg by mouth 2 times daily       metoprolol tartrate (LOPRESSOR) 100 MG tablet Take 100 mg by mouth 2 times daily        mirtazapine (REMERON) 7.5 MG tablet Take 7.5 mg by mouth At Bedtime       senna-docusate (SENOKOT-S/PERICOLACE) 8.6-50 MG tablet Take 1 tablet by mouth daily  as needed for constipation       simethicone (MYLICON) 80 MG chewable tablet Take 80 mg by mouth 4 times daily as needed for flatulence or cramping       simvastatin (ZOCOR) 20 MG tablet Take 20 mg by mouth At Bedtime       ***  Medication Changes/Rationale:     ***    Controlled medications sent with patient:   {fgscontrolledmeds1:598931}     ROS:   {ROS FGS:703804:::0}    Physical Exam:   Vitals: /74   Pulse 116   Temp 98.2  F (36.8  C)   Resp 16   Wt 59.8 kg (131 lb 12.8 oz)   SpO2 98%   BMI 26.62 kg/m    BMI= Body mass index is 26.62 kg/m .  {NURSING HOME PHYSICAL EXAM:332553}     SNF labs: {fgslabdischarge:968345}  {fgsinrtable:328459:x}    DISCHARGE PLAN:    Follow up labs: {fgsfuturelabs1:872015}    Medical Follow Up:      {fgsdischargefollowup:210358}    MTM referral needed and placed by this provider: {YES (EXPLAIN)/NO:781702}    Current [place] scheduled appointments:   ***    Discharge Services: {fgsdcservices1:972301}    Discharge Instructions Verbalized to Patient at Discharge:     {fgsdischargeinstructions1:391389}      TOTAL DISCHARGE TIME:   {TIME:751917}  Electronically signed by:  Sagrario Palacios MA ***    {fgshomecare F2F:140827}  {Providers Please double check the med list (in the plan section >> meds & orders tab) and Discontinue any of the meds flagged by the TC to be discontinued}

## 2019-08-05 ENCOUNTER — TELEPHONE (OUTPATIENT)
Dept: FAMILY MEDICINE | Facility: CLINIC | Age: 84
End: 2019-08-05

## 2019-08-05 NOTE — TELEPHONE ENCOUNTER
"ED/Discharge Protocol    \"Hi, my name is Savi Elder, a registered nurse, and I am calling on behalf of Dr. Russell's office at [place].  I am calling to follow up and see how things are going for you after your recent visit.\"    \"pt was in hospital and then nursing home and now has been discharged home.\"  Is patient experiencing symptoms that may require a hospital visit?  no    Discharge Instructions    \"Let's review your discharge instructions.  What is/are the follow-up recommendations?  Pt. Response: I need to schedule a follow up appointment with Dr. Russell.    \"Were you instructed to make a follow-up appointment?\"  Pt. Response: yes.   Appointment made.      \"When you see the provider, I would recommend that you bring your discharge instructions with you.    [facility] Summary    \"Do you have any questions or concerns about your condition or care plan at the moment?\"    No      Savi GARCIAN, RN              "

## 2019-08-05 NOTE — TELEPHONE ENCOUNTER
This pt was Discharged from Geriatric Services on 8/3/19 for Left-sided weakness      Please note this information was received from either Annika or Nirmala PHAN  IP daily report with Dr Russell identified as the PCP.    A follow-up visit has not been scheduled.    Please follow-up with patient accordingly.

## 2019-08-07 ENCOUNTER — TELEPHONE (OUTPATIENT)
Dept: FAMILY MEDICINE | Facility: CLINIC | Age: 84
End: 2019-08-07

## 2019-08-09 ENCOUNTER — TRANSFERRED RECORDS (OUTPATIENT)
Dept: HEALTH INFORMATION MANAGEMENT | Facility: CLINIC | Age: 84
End: 2019-08-09

## 2019-08-11 PROBLEM — I48.20 CHRONIC ATRIAL FIBRILLATION (H): Status: ACTIVE | Noted: 2019-06-09

## 2019-08-11 ASSESSMENT — ENCOUNTER SYMPTOMS
ARTHRALGIAS: 0
HEADACHES: 0
SORE THROAT: 0
CONSTIPATION: 0
NAUSEA: 0
APPETITE CHANGE: 0
FREQUENCY: 0
DIZZINESS: 0
ABDOMINAL PAIN: 0
FEVER: 0
EYE PAIN: 0
COUGH: 0
HEARTBURN: 0
SHORTNESS OF BREATH: 0

## 2019-08-11 NOTE — ASSESSMENT & PLAN NOTE
Admitted to the TCU for therapy after hospital stay.  Continue working with speech therapy.  Continue PT/OT.   following for discharge planning.  Continues on statin and apixaban.
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
## 2019-08-11 NOTE — ASSESSMENT & PLAN NOTE
Her pulses have been increasing.  Her metoprolol was recently increased to 100 mg twice daily.  Continue to monitor heart rate.

## 2019-08-12 ENCOUNTER — OFFICE VISIT (OUTPATIENT)
Dept: FAMILY MEDICINE | Facility: CLINIC | Age: 84
End: 2019-08-12
Payer: MEDICARE

## 2019-08-12 ENCOUNTER — ANCILLARY PROCEDURE (OUTPATIENT)
Dept: GENERAL RADIOLOGY | Facility: CLINIC | Age: 84
End: 2019-08-12
Attending: FAMILY MEDICINE
Payer: MEDICARE

## 2019-08-12 VITALS
BODY MASS INDEX: 26.81 KG/M2 | DIASTOLIC BLOOD PRESSURE: 68 MMHG | OXYGEN SATURATION: 97 % | RESPIRATION RATE: 18 BRPM | SYSTOLIC BLOOD PRESSURE: 121 MMHG | HEIGHT: 59 IN | TEMPERATURE: 97.9 F | WEIGHT: 133 LBS | HEART RATE: 111 BPM

## 2019-08-12 DIAGNOSIS — R06.02 SOB (SHORTNESS OF BREATH): Primary | ICD-10-CM

## 2019-08-12 DIAGNOSIS — J18.9 PNEUMONIA DUE TO INFECTIOUS ORGANISM, UNSPECIFIED LATERALITY, UNSPECIFIED PART OF LUNG: ICD-10-CM

## 2019-08-12 DIAGNOSIS — M10.00 IDIOPATHIC GOUT, UNSPECIFIED CHRONICITY, UNSPECIFIED SITE: ICD-10-CM

## 2019-08-12 DIAGNOSIS — R06.02 SOB (SHORTNESS OF BREATH): ICD-10-CM

## 2019-08-12 DIAGNOSIS — D64.9 ANEMIA, UNSPECIFIED TYPE: ICD-10-CM

## 2019-08-12 DIAGNOSIS — I10 HYPERTENSION GOAL BP (BLOOD PRESSURE) < 140/90: ICD-10-CM

## 2019-08-12 DIAGNOSIS — E78.5 HYPERLIPIDEMIA LDL GOAL <130: ICD-10-CM

## 2019-08-12 LAB
ERYTHROCYTE [DISTWIDTH] IN BLOOD BY AUTOMATED COUNT: 16.6 % (ref 10–15)
HCT VFR BLD AUTO: 33.8 % (ref 35–47)
HGB BLD-MCNC: 9.8 G/DL (ref 11.7–15.7)
MCH RBC QN AUTO: 30.7 PG (ref 26.5–33)
MCHC RBC AUTO-ENTMCNC: 29 G/DL (ref 31.5–36.5)
MCV RBC AUTO: 106 FL (ref 78–100)
PLATELET # BLD AUTO: 469 10E9/L (ref 150–450)
RBC # BLD AUTO: 3.19 10E12/L (ref 3.8–5.2)
WBC # BLD AUTO: 10.6 10E9/L (ref 4–11)

## 2019-08-12 PROCEDURE — 36415 COLL VENOUS BLD VENIPUNCTURE: CPT | Performed by: FAMILY MEDICINE

## 2019-08-12 PROCEDURE — 99214 OFFICE O/P EST MOD 30 MIN: CPT | Performed by: FAMILY MEDICINE

## 2019-08-12 PROCEDURE — 85027 COMPLETE CBC AUTOMATED: CPT | Performed by: FAMILY MEDICINE

## 2019-08-12 PROCEDURE — 71046 X-RAY EXAM CHEST 2 VIEWS: CPT

## 2019-08-12 RX ORDER — METOPROLOL TARTRATE 100 MG
100 TABLET ORAL 2 TIMES DAILY
Qty: 180 TABLET | Refills: 1 | Status: SHIPPED | OUTPATIENT
Start: 2019-08-12 | End: 2019-09-10

## 2019-08-12 RX ORDER — FERROUS SULFATE 325(65) MG
325 TABLET ORAL
Qty: 90 TABLET | Refills: 0 | Status: SHIPPED | OUTPATIENT
Start: 2019-08-12 | End: 2019-01-01

## 2019-08-12 RX ORDER — CEFUROXIME AXETIL 500 MG/1
500 TABLET ORAL 2 TIMES DAILY
Qty: 20 TABLET | Refills: 0 | Status: SHIPPED | OUTPATIENT
Start: 2019-08-12 | End: 2019-09-04

## 2019-08-12 RX ORDER — ALLOPURINOL 100 MG/1
100 TABLET ORAL DAILY
Qty: 90 TABLET | Refills: 1 | Status: SHIPPED | OUTPATIENT
Start: 2019-08-12 | End: 2019-01-01

## 2019-08-12 RX ORDER — SIMVASTATIN 20 MG
20 TABLET ORAL AT BEDTIME
Qty: 90 TABLET | Refills: 1 | Status: SHIPPED | OUTPATIENT
Start: 2019-08-12

## 2019-08-12 RX ORDER — METOPROLOL TARTRATE 100 MG
100 TABLET ORAL 2 TIMES DAILY
COMMUNITY
End: 2019-08-12

## 2019-08-12 ASSESSMENT — MIFFLIN-ST. JEOR: SCORE: 968.91

## 2019-08-12 NOTE — LETTER
My Depression Action Plan  Name: Nan Doran   Date of Birth 1935  Date: 8/9/2019    My doctor: Naga Russell   My clinic: [facility]  [id number] [place] [id number] [phone]          GREEN    ZONE   Good Control    What it looks like:     Things are going generally well. You have normal up s and down s. You may even feel depressed from time to time, but bad moods usually last less than a day.   What you need to do:  1. Continue to care for yourself (see self care plan)  2. Check your depression survival kit and update it as needed  3. Follow your physician s recommendations including any medication.  4. Do not stop taking medication unless you consult with your physician first.           YELLOW         ZONE Getting Worse    What it looks like:     Depression is starting to interfere with your life.     It may be hard to get out of bed; you may be starting to isolate yourself from others.    Symptoms of depression are starting to last most all day and this has happened for several days.     You may have suicidal thoughts but they are not constant.   What you need to do:     1. Call your care team, your response to treatment will improve if you keep your care team informed of your progress. Yellow periods are signs an adjustment may need to be made.     2. Continue your self-care, even if you have to fake it!    3. Talk to someone in your support network    4. Open up your depression survival kit           RED    ZONE Medical Alert - Get Help    What it looks like:     Depression is seriously interfering with your life.     You may experience these or other symptoms: You can t get out of bed most days, can t work or engage in other necessary activities, you have trouble taking care of basic hygiene, or basic responsibilities, thoughts of suicide or death that will not go away, self-injurious behavior.     What you need to do:  1. Call your care team and request a  same-day appointment. If they are not available (weekends or after hours) call your local crisis line, emergency room or 911.            Depression Self Care Plan / Survival Kit    Self-Care for Depression  Here s the deal. Your body and mind are really not as separate as most people think.  What you do and think affects how you feel and how you feel influences what you do and think. This means if you do things that people who feel good do, it will help you feel better.  Sometimes this is all it takes.  There is also a place for medication and therapy depending on how severe your depression is, so be sure to consult with your medical provider and/ or Behavioral Health Consultant if your symptoms are worsening or not improving.     In order to better manage my stress, I will:    Exercise  Get some form of exercise, every day. This will help reduce pain and release endorphins, the  feel good  chemicals in your brain. This is almost as good as taking antidepressants!  This is not the same as joining a gym and then never going! (they count on that by the way ) It can be as simple as just going for a walk or doing some gardening, anything that will get you moving.      Hygiene   Maintain good hygiene (Get out of bed in the morning, Make your bed, Brush your teeth, Take a shower, and Get dressed like you were going to work, even if you are unemployed).  If your clothes don't fit try to get ones that do.    Diet  I will strive to eat foods that are good for me, drink plenty of water, and avoid excessive sugar, caffeine, alcohol, and other mood-altering substances.  Some foods that are helpful in depression are: complex carbohydrates, B vitamins, flaxseed, fish or fish oil, fresh fruits and vegetables.    Psychotherapy  I agree to participate in Individual Therapy (if recommended).    Medication  If prescribed medications, I agree to take them.  Missing doses can result in serious side effects.  I understand that drinking  alcohol, or other illicit drug use, may cause potential side effects.  I will not stop my medication abruptly without first discussing it with my provider.    Staying Connected With Others  I will stay in touch with my friends, family members, and my primary care provider/team.    Use your imagination  Be creative.  We all have a creative side; it doesn t matter if it s oil painting, sand castles, or mud pies! This will also kick up the endorphins.    Witness Beauty  (AKA stop and smell the roses) Take a look outside, even in mid-winter. Notice colors, textures. Watch the squirrels and birds.     Service to others  Be of service to others.  There is always someone else in need.  By helping others we can  get out of ourselves  and remember the really important things.  This also provides opportunities for practicing all the other parts of the program.    Humor  Laugh and be silly!  Adjust your TV habits for less news and crime-drama and more comedy.    Control your stress  Try breathing deep, massage therapy, biofeedback, and meditation. Find time to relax each day.     My support system    Clinic Contact:  Phone number:    Contact 1:  Phone number:    Contact 2:  Phone number:    Orthodox/:  Phone number:    Therapist:  Phone number:    Local crisis center:    Phone number:    Other community support:  Phone number:

## 2019-08-12 NOTE — LETTER
"My Heart Failure Action Plan   Name: Nan Doran    YOB: 1935   Date: 8/9/2019    My doctor: Naga Russell     [place] [address] [address] [facility] [address] [phone]  My Diagnosis: {HF STAGES:666902}   My Exercise Goal: 30 minutes daily  .     My Weight Plan:   Wt Readings from Last 2 Encounters:   08/01/19 59.8 kg (131 lb 12.8 oz)   07/29/19 60 kg (132 lb 3.2 oz)     Weigh yourself daily using the same scale. If you gain more than 2 pounds in 24 hours or 5 pounds in a week {HF WEIGHT GOAL:307121}    My Diet Goal: { :342401::\"No added salt\"}    Emergency Room Visits:    Our goal is to improve your quality of life and help you avoid a visit to the emergency room or hospital.  If we work together, we can achieve this goal. But, if you feel you need to call 911 or go to the emergency room, please do so.  If you go to the emergency room, please bring your list of medicines and your daily weight chart with you.       GREEN ZONE     Doing well today    Weight gained is no more than 2 pounds a day or 5 pounds a week.    No swelling in feet, ankles, legs or stomach.    No more swelling than usual.    No more trouble breathing than usual.    No change in my sleep.    No other problems. Actions:    I am doing fine.  I will take my medicine, follow my diet, see my doctor, exercise, and watch for symptoms.           YELLOW ZONE         Having a bad day or flare up    Weight gain of more than 2 pounds in one day or 5 pounds in one week.    New swelling in ankle, leg, knee or thigh.    Bloating in belly, pants feel tighter.    Swelling in hands or face.    Coughing or trouble breathing while walking or talking.    Harder to breathe last night.    Have trouble sleeping, wake up short of breath.    Much more tired than usual.    Not eating.    Pain in my chest or bad leg cramps.    Feel weak or dizzy. Actions:    I need to take action and call my doctor or nurse today.          "        RED ZONE         Need medical care now    Weight gain of 5 pounds overnight.    Chest pain or pressure that does not go away.    Feel less alert.    Wheezing or have trouble breathing when at rest.    Cannot sleep lying down.    Cannot take my water pill.    Pass out or faint. Actions:    I need to call my doctor or nurse now!    Call 911 if I have chest pain or cannot breathe.
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
## 2019-08-16 ENCOUNTER — TELEPHONE (OUTPATIENT)
Dept: FAMILY MEDICINE | Facility: CLINIC | Age: 84
End: 2019-08-16

## 2019-08-16 NOTE — TELEPHONE ENCOUNTER
Ok to take iron pill every other day and miralax daily prn (but don't take every day long term). Ok for colace BID everyday. More water. To ER if worse/not improving overall in next by monday. Naga Russell MD

## 2019-08-16 NOTE — TELEPHONE ENCOUNTER
Reason for call:  Patient reporting a symptom constipation     Symptom or request:   Wondering if she can stop taking the iron medication which patient believes is causing the constipation. Patient would like to take an OTC medication for this     Patient stated the medication for her upper resp infection is working good.    Duration (how long have symptoms been present): 4 days     Have you been treated for this before? No    Additional comments:     Phone Number patient can be reached at:  Other phone number:  [phone]    Best Time:      Can we leave a detailed message on this number:  YES    Call taken on 8/16/2019 at 10:46 AM by Karen Sanchez

## 2019-08-16 NOTE — TELEPHONE ENCOUNTER
I spoke with pt daughter.  Pt has not felt well for a while. She did come in and see Dr. Russell who diagnosised her with pneumonia and gave her an antibiotic.  Pt also has anemia and is on an iron pill. Pt is only eating once a day, a meals on wheels meal.  Pt is not drinking very much fluids. She is not moving around much and doing a lot of sitting.  She is more lethargic than normal.  Pt does not have a fever.      Pt has had constipation for 4 days.    I asked when the last time pt urinated was, and daughter will check with pt.  Advised she needs to be urinating at least once every 8 hours while awake, preferably every 4-5 hours.  If not she should push fluids until she is doing this.  If it has been more than 8 hours then she needs to go to the ER for evaluation and possible hydration.  If she is urinating sufficiently then she should try to take in at least 1 tablespoon of fluid every 15 min while awake. Advised other sx of dehydration are dry skin and dry mucous membranes like lips.    Advised of pt sx worsen, new sx, or dehydrated then pt should be seen. If dehydrated go to ER.  If unsure ok to start at urgent care and they will send you to ER if needed.  Pt daughter verbalized understanding.    1)  Pt daughter states pt is asking if she can stop the iron pills?    2)  Pt daughter unsure if pt is using senna-docusate now, she will check. Pt wanted to know if she can use Miralax?  How often?    Savi GARCIAN, RN

## 2019-08-16 NOTE — TELEPHONE ENCOUNTER
Pt daughter notified of provider message as written.  Pt daughter verbalized good understanding.  She said pt has urinated twice today already and it was a light color.  She will work on drinking small amounts all day.  She does not have the senna-doucasate.  Advised the colace and miralax will replace that.  No further questions.  Savi GARCIAN, RN

## 2019-08-18 ENCOUNTER — TRANSFERRED RECORDS (OUTPATIENT)
Dept: HEALTH INFORMATION MANAGEMENT | Facility: CLINIC | Age: 84
End: 2019-08-18

## 2019-08-18 ENCOUNTER — OFFICE VISIT (OUTPATIENT)
Dept: URGENT CARE | Facility: URGENT CARE | Age: 84
End: 2019-08-18
Payer: MEDICARE

## 2019-08-18 ENCOUNTER — ANCILLARY PROCEDURE (OUTPATIENT)
Dept: GENERAL RADIOLOGY | Facility: CLINIC | Age: 84
End: 2019-08-18
Attending: FAMILY MEDICINE
Payer: MEDICARE

## 2019-08-18 VITALS
RESPIRATION RATE: 18 BRPM | OXYGEN SATURATION: 100 % | TEMPERATURE: 97.6 F | SYSTOLIC BLOOD PRESSURE: 106 MMHG | DIASTOLIC BLOOD PRESSURE: 72 MMHG | HEART RATE: 128 BPM

## 2019-08-18 DIAGNOSIS — R06.02 SOB (SHORTNESS OF BREATH): ICD-10-CM

## 2019-08-18 DIAGNOSIS — I48.91 ATRIAL FIBRILLATION WITH RVR (H): Primary | ICD-10-CM

## 2019-08-18 PROCEDURE — 93000 ELECTROCARDIOGRAM COMPLETE: CPT | Performed by: FAMILY MEDICINE

## 2019-08-18 PROCEDURE — 71046 X-RAY EXAM CHEST 2 VIEWS: CPT

## 2019-08-18 PROCEDURE — 99215 OFFICE O/P EST HI 40 MIN: CPT | Performed by: FAMILY MEDICINE

## 2019-08-18 NOTE — PROGRESS NOTES
Subjective     Nan Doran is a 84 year old female who presents to clinic today for the following health issues:    HPI   Chief Complaint   Patient presents with     Urgent Care     Cough     States meds given to pneumonia are making her sick would like another evaluation        Duration: 5 days     Description (location/character/radiation): Feeling nauseous since started antibiotic, complains of some shortness of breath    Intensity:  moderate    Accompanying signs and symptoms: no fever, chills, cough, chest pain    History (similar episodes/previous evaluation): AF, [facility].  Patient was started on Ceftin for suspected pneumonia by Dr. Russell on Monday    Precipitating or alleviating factors: None    Therapies tried and outcome: ceftin         Patient Active Problem List   Diagnosis     Gout     Hypertension goal BP (blood pressure) < 140/90     Advanced directives, counseling/discussion     Hyperlipidemia LDL goal <130     CKD (chronic kidney disease) stage 3, GFR 30-59 ml/min (H)     Anemia due to other cause, other cause of anemia, not classified     Acute kidney injury superimposed on chronic kidney disease (H)     Chronic atrial fibrillation (H)     CHF (congestive heart failure) (H)     Chronic anticoagulation     Closed fracture of left ankle     Diarrhea     Displaced fracture of medial malleolus of unspecified tibia, initial encounter for closed fracture     Tophaceous gout     Left-sided weakness     History of CVA (cerebrovascular accident)     Past Surgical History:   Procedure Laterality Date     GYN SURGERY      tubal     HC DUPLEX SCAN EXTRACRANIAL, LIMITED UNILATERAL      normal carotid ultrasound 2006     HEAD & NECK SURGERY      tonsils       Social History     Tobacco Use     Smoking status: Former Smoker     Last attempt to quit: 1984     Years since quittin.6     Smokeless tobacco: Never Used   Substance Use Topics     Alcohol use: No     Family History   Problem Relation Age of Onset      Cerebrovascular Disease Father      Diabetes Paternal Grandmother      Blood Disease Daughter         lupus     Blood Disease Daughter         lupus         Current Outpatient Medications   Medication Sig Dispense Refill     Acetaminophen 650 MG/20.3ML SOLN Take 20.3 mLs by mouth 3 times daily Also TID PRN       allopurinol (ZYLOPRIM) 100 MG tablet Take 1 tablet (100 mg) by mouth daily New to help prevent gout attacks 90 tablet 1     apixaban ANTICOAGULANT (ELIQUIS) 2.5 MG tablet Take 2.5 mg by mouth 2 times daily       ferrous sulfate (FEROSUL) 325 (65 Fe) MG tablet Take 1 tablet (325 mg) by mouth daily (with breakfast) Iron pill 90 tablet 0     metoprolol tartrate (LOPRESSOR) 100 MG tablet Take 1 tablet (100 mg) by mouth 2 times daily For blood pressure and slows pulse 180 tablet 1     simethicone (MYLICON) 80 MG chewable tablet Take 80 mg by mouth 4 times daily as needed for flatulence or cramping       simvastatin (ZOCOR) 20 MG tablet Take 1 tablet (20 mg) by mouth At Bedtime For cholesterol 90 tablet 1     calcium carbonate (TUMS) 500 MG chewable tablet Take 1 chew tab by mouth every 4 hours as needed for heartburn       cefuroxime (CEFTIN) 500 MG tablet Take 1 tablet (500 mg) by mouth 2 times daily for 10 days Antibiotic for pneumonia (Patient not taking: Reported on 8/18/2019) 20 tablet 0     senna-docusate (SENOKOT-S/PERICOLACE) 8.6-50 MG tablet Take 1 tablet by mouth daily as needed for constipation       Allergies   Allergen Reactions     Erythromycin      Recent Labs   Lab Test 07/30/19 07/12/19 07/10/19 07/06/19 01/04/19  0857  04/19/16  0905 09/15/15  0830  06/12/14  0910  04/12/13  0816   LDL  --   --   --   --  42  --  53  --   --  62  --  86   HDL  --   --   --   --  44*  --  45*  --   --  40*  --  42*   TRIG  --   --   --   --  136  --  185*  --   --  191*  --  208*   ALT  --   --   --  30  --   --   --  19  --   --   --  18   CR 0.78 0.83  --   --  1.30*   < > 1.19* 1.38*   < > 1.22*   < >  1.36*   GFRESTIMATED >60 >60  --   --  38*   < > 44* 37*   < > 43*   < > 38*   GFRESTBLACK >60 >60  --   --  44*   < > 53* 45*   < > 52*   < > 46*   POTASSIUM 4.2 4.9  --   --  4.8   < > 4.4 4.2   < > 4.6   < > 4.5   TSH  --   --  1.06  --   --   --   --   --   --   --   --   --     < > = values in this interval not displayed.      BP Readings from Last 3 Encounters:   08/18/19 106/72   08/12/19 121/68   08/01/19 123/74    Wt Readings from Last 3 Encounters:   08/12/19 60.3 kg (133 lb)   08/01/19 59.8 kg (131 lb 12.8 oz)   07/29/19 60 kg (132 lb 3.2 oz)                    Reviewed and updated as needed this visit by Provider         Review of Systems   ROS COMP: Constitutional, HEENT, cardiovascular, pulmonary, GI, , musculoskeletal, neuro, skin, endocrine and psych systems are negative, except as otherwise noted.      Objective    /72   Pulse 128   Temp 97.6  F (36.4  C) (Oral)   Resp 18   SpO2 100%   There is no height or weight on file to calculate BMI.  Physical Exam   GENERAL: alert, no distress, frail and elderly  EYES: Eyes grossly normal to inspection, PERRL and conjunctivae and sclerae normal  HENT: normal cephalic/atraumatic, nose and mouth without ulcers or lesions, oropharynx clear and oral mucous membranes moist  NECK: no adenopathy, no asymmetry, masses, or scars and thyroid normal to palpation  RESP: few bibasilar crackles, no wheeze or rhonchi auscultated  CV: tachycardia, irregularly irregular rhythm, normal S1 S2, no S3 or S4 and no murmur, click or rub  ABDOMEN: soft, nontender  MS: gouty swelling involving finger joints, chronic OA changes involving multiple joints  NEURO: Grossly intact    CHEST TWO VIEWS 8/18/2019 2:09 PM      HISTORY: SOB (shortness of breath).     COMPARISON: August 12, 2019      FINDINGS: Mild bibasilar atelectasis and/or infiltrate. There are no  acute infiltrates. The cardiac silhouette is mildly prominent but  stable. Pulmonary vasculature is  "unremarkable.                                                                      IMPRESSION: Mild bibasilar atelectasis and/or infiltrate.      Assessment & Plan     1. SOB (shortness of breath)  -Patient was seen by PCP last week for routine physical, diagnosed with community-acquired pneumonia and was started on Ceftin.  Patient experiencing nausea since started antibiotic and now having some shortness of breath since yesterday.  Physical examination remarkable for tachycardia.  ECG showed irregularly irregular rhythm, rate 122, T wave in inferior leads.  Chest x-ray showed no acute changes.  Medications reviewed.  Needs further evaluation to explain atrial fibrillation with RVR.  Suggested to go ER for further evaluation and management.  Patient/son understood and in agreement with above plan.  All questions answered.  - XR Chest 2 Views; Future  - EKG 12-lead complete w/read - Clinics      2. Atrial fibrillation with RVR (H)  As above        BMI:   Estimated body mass index is 26.86 kg/m  as calculated from the following:    Height as of 8/12/19: 1.499 m (4' 11\").    Weight as of 8/12/19: 60.3 kg (133 lb).           Octavio Gaspar MD  [employer]        "

## 2019-08-19 LAB — EJECTION FRACTION: 38

## 2019-08-24 ENCOUNTER — DOCUMENTATION ONLY (OUTPATIENT)
Dept: CARE COORDINATION | Facility: CLINIC | Age: 84
End: 2019-08-24

## 2019-08-24 NOTE — PROGRESS NOTES
Dear Dr. Russell  Medicare Home Health regulations requires [facility] to provide an initial assessment visit either within 48 hours of the patient's return home, or on the physician ordered Start of Care date.    There will be a delay in the Initial Assessment for Nan Doran; MRN [id number]  Her Start of Care has been confirmed for 8/26/19.      Sincerely [facility]  sEtefania Peters RN  [phone]

## 2019-08-27 ENCOUNTER — TRANSFERRED RECORDS (OUTPATIENT)
Dept: HEALTH INFORMATION MANAGEMENT | Facility: CLINIC | Age: 84
End: 2019-08-27

## 2019-09-04 ENCOUNTER — OFFICE VISIT (OUTPATIENT)
Dept: FAMILY MEDICINE | Facility: CLINIC | Age: 84
End: 2019-09-04
Payer: COMMERCIAL

## 2019-09-04 VITALS
TEMPERATURE: 98.1 F | OXYGEN SATURATION: 99 % | RESPIRATION RATE: 18 BRPM | SYSTOLIC BLOOD PRESSURE: 102 MMHG | HEART RATE: 84 BPM | BODY MASS INDEX: 24.8 KG/M2 | WEIGHT: 123 LBS | DIASTOLIC BLOOD PRESSURE: 57 MMHG | HEIGHT: 59 IN

## 2019-09-04 DIAGNOSIS — D53.9 MACROCYTIC ANEMIA: Primary | ICD-10-CM

## 2019-09-04 LAB
FERRITIN SERPL-MCNC: 53 NG/ML (ref 8–252)
FOLATE SERPL-MCNC: 16.2 NG/ML
IRON SATN MFR SERPL: 13 % (ref 15–46)
IRON SERPL-MCNC: 38 UG/DL (ref 35–180)
RETICS # AUTO: 69.7 10E9/L (ref 25–95)
RETICS/RBC NFR AUTO: 2 % (ref 0.5–2)
T4 FREE SERPL-MCNC: 1.24 NG/DL (ref 0.76–1.46)
TIBC SERPL-MCNC: 301 UG/DL (ref 240–430)
TSH SERPL DL<=0.005 MIU/L-ACNC: 5.19 MU/L (ref 0.4–4)
VIT B12 SERPL-MCNC: 304 PG/ML (ref 193–986)

## 2019-09-04 PROCEDURE — 85045 AUTOMATED RETICULOCYTE COUNT: CPT | Performed by: PHYSICIAN ASSISTANT

## 2019-09-04 PROCEDURE — 84443 ASSAY THYROID STIM HORMONE: CPT | Performed by: PHYSICIAN ASSISTANT

## 2019-09-04 PROCEDURE — 82607 VITAMIN B-12: CPT | Performed by: PHYSICIAN ASSISTANT

## 2019-09-04 PROCEDURE — 99213 OFFICE O/P EST LOW 20 MIN: CPT | Performed by: PHYSICIAN ASSISTANT

## 2019-09-04 PROCEDURE — 84439 ASSAY OF FREE THYROXINE: CPT | Performed by: PHYSICIAN ASSISTANT

## 2019-09-04 PROCEDURE — 36415 COLL VENOUS BLD VENIPUNCTURE: CPT | Performed by: PHYSICIAN ASSISTANT

## 2019-09-04 PROCEDURE — 83550 IRON BINDING TEST: CPT | Performed by: PHYSICIAN ASSISTANT

## 2019-09-04 PROCEDURE — 82746 ASSAY OF FOLIC ACID SERUM: CPT | Performed by: PHYSICIAN ASSISTANT

## 2019-09-04 PROCEDURE — 82728 ASSAY OF FERRITIN: CPT | Performed by: PHYSICIAN ASSISTANT

## 2019-09-04 PROCEDURE — 83540 ASSAY OF IRON: CPT | Performed by: PHYSICIAN ASSISTANT

## 2019-09-04 ASSESSMENT — MIFFLIN-ST. JEOR: SCORE: 923.55

## 2019-09-04 NOTE — LETTER
"My Heart Failure Action Plan   Name: Nan Doran    YOB: 1935   Date: 9/4/2019    My doctor: Naga Russell     [place] [address] [address] [facility] [address] [phone]  My Diagnosis: {HF STAGES:105846}   My Exercise Goal: 30 minutes daily  .     My Weight Plan:   Wt Readings from Last 2 Encounters:   08/12/19 60.3 kg (133 lb)   08/01/19 59.8 kg (131 lb 12.8 oz)     Weigh yourself daily using the same scale. If you gain more than 2 pounds in 24 hours or 5 pounds in a week {HF WEIGHT GOAL:236474}    My Diet Goal: { :284969::\"No added salt\"}    Emergency Room Visits:    Our goal is to improve your quality of life and help you avoid a visit to the emergency room or hospital.  If we work together, we can achieve this goal. But, if you feel you need to call 911 or go to the emergency room, please do so.  If you go to the emergency room, please bring your list of medicines and your daily weight chart with you.       GREEN ZONE     Doing well today    Weight gained is no more than 2 pounds a day or 5 pounds a week.    No swelling in feet, ankles, legs or stomach.    No more swelling than usual.    No more trouble breathing than usual.    No change in my sleep.    No other problems. Actions:    I am doing fine.  I will take my medicine, follow my diet, see my doctor, exercise, and watch for symptoms.           YELLOW ZONE         Having a bad day or flare up    Weight gain of more than 2 pounds in one day or 5 pounds in one week.    New swelling in ankle, leg, knee or thigh.    Bloating in belly, pants feel tighter.    Swelling in hands or face.    Coughing or trouble breathing while walking or talking.    Harder to breathe last night.    Have trouble sleeping, wake up short of breath.    Much more tired than usual.    Not eating.    Pain in my chest or bad leg cramps.    Feel weak or dizzy. Actions:    I need to take action and call my doctor or nurse today.                 " RED ZONE         Need medical care now    Weight gain of 5 pounds overnight.    Chest pain or pressure that does not go away.    Feel less alert.    Wheezing or have trouble breathing when at rest.    Cannot sleep lying down.    Cannot take my water pill.    Pass out or faint. Actions:    I need to call my doctor or nurse now!    Call 911 if I have chest pain or cannot breathe.
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
## 2019-09-06 ENCOUNTER — TELEPHONE (OUTPATIENT)
Dept: FAMILY MEDICINE | Facility: CLINIC | Age: 84
End: 2019-09-06

## 2019-09-06 NOTE — TELEPHONE ENCOUNTER
Panel Management Review      Patient has the following on her problem list:     Hypertension   Last three blood pressure readings:  BP Readings from Last 3 Encounters:   19 102/57   19 106/72   19 121/68     Blood pressure: Passed    HTN Guidelines:  Less than 140/90      Composite cancer screening  Chart review shows that this patient is due/due soon for the following None  Summary:    Patient is due/failing the followin    Action needed:   0    Type of outreach:    0    Questions for provider review:    None                                                                                                                                    Savi Hamilton CMA     Chart routed to 0 .

## 2019-09-06 NOTE — TELEPHONE ENCOUNTER
Pt notified via phone of results and plan as written in provider note below.    Pt states she sees Cardiology at the [facility] in [place], [place], and has next appointment on 9/23/19 with Cardiology.  Pt states due to transportation challenges she is not willing to schedule a primary care follow-up appointment to recheck anemia if this can be done during Cardiology lab appointment.    Pt inquires if a follow-up appointment is absolutely necessary, or if she can have labs drawn when she sees her Cardiologist on 9/23/19?    Heather Mathews, RADHAN, RN

## 2019-09-06 NOTE — TELEPHONE ENCOUNTER
----- Message from Low Harris PA-C sent at 9/6/2019  1:51 PM CDT -----  RN please call patient with normal/  near normal  Results.  No further testing needed at this time.   I would like her to see Kadi GARZA in 2 wks for recheck anemia.     Low Harris PA-C

## 2019-09-09 ENCOUNTER — TELEPHONE (OUTPATIENT)
Dept: FAMILY MEDICINE | Facility: CLINIC | Age: 84
End: 2019-09-09

## 2019-09-09 DIAGNOSIS — I10 HYPERTENSION GOAL BP (BLOOD PRESSURE) < 140/90: ICD-10-CM

## 2019-09-09 NOTE — TELEPHONE ENCOUNTER
Reason for Call:  Form, our goal is to have forms completed with 72 hours, however, some forms may require a visit or additional information.    Type of letter, form or note:  Home Health Certification    Who is the form from?: Home care    Where did the form come from: form was faxed in    What clinic location was the form placed at?: [place]    Where the form was placed: Given to MA/RN    What number is listed as a contact on the form?: [phone]       Additional comments:     Call taken on 9/9/2019 at 8:40 AM by Batsheva Roth

## 2019-09-09 NOTE — TELEPHONE ENCOUNTER
This RN reconciled medication list from [facility] form against our [facility] medication list and there are discrepancies.  They are:        Patient was discharged from [facility] 9/6/19 for heart failure.  She was put on 5 new medications that are on the [facility] form but not listed on [facility] medication list.      1. -- lisinopril 5 mg 1 tab daily     2. -- torsemide 20 mg 1 tab daily     3. -- metoprolol  mg 1 tab daily now instead of twice daily     4. -- amiodarone 200 mg 1 tab daily     5. -- acetaminophen 650 mg/ 20.3 mL sol  10.2- 20.3 mL by mouth every 4 hrs prn pain       Would you like these added to patient's Epic medication list?        Alyse Hare BSN, RN

## 2019-09-10 DIAGNOSIS — Z53.9 DIAGNOSIS NOT YET DEFINED: Primary | ICD-10-CM

## 2019-09-10 PROCEDURE — G0180 MD CERTIFICATION HHA PATIENT: HCPCS | Performed by: FAMILY MEDICINE

## 2019-09-10 RX ORDER — LISINOPRIL 5 MG/1
5 TABLET ORAL DAILY
COMMUNITY
Start: 2019-09-10 | End: 2019-01-01

## 2019-09-10 RX ORDER — TORSEMIDE 20 MG/1
20 TABLET ORAL DAILY
COMMUNITY
Start: 2019-09-10 | End: 2020-01-01

## 2019-09-10 RX ORDER — METOPROLOL TARTRATE 100 MG
100 TABLET ORAL DAILY
Qty: 90 TABLET | Refills: 1 | COMMUNITY
Start: 2019-09-10 | End: 2019-01-01

## 2019-09-10 RX ORDER — AMIODARONE HYDROCHLORIDE 200 MG/1
100 TABLET ORAL DAILY
COMMUNITY
Start: 2019-09-10 | End: 2020-01-01

## 2019-09-10 NOTE — TELEPHONE ENCOUNTER
This RN made the requested changes  Form put in PCP's box to sign.  Please route this message back to TC along with form.     Alyse GARCIAN, RN

## 2019-09-10 NOTE — TELEPHONE ENCOUNTER
I faxed the completed and signed C forms to [facility] @[phone].  Tracey Bennett,

## 2019-09-10 NOTE — TELEPHONE ENCOUNTER
She can talk with the cardiologist as it is an [facility] for future labs to be done.    Would like: CBC, peripheral smear, and hepatic panel and TSH.    Thanks  Low Harris PA-C

## 2019-09-10 NOTE — TELEPHONE ENCOUNTER
I called and spoke with patient.  Gave her Low Harris PA-C's instructions.  She states that she is doing nothing until she sees her cardiologist on 9/23.  She states that she will speak with him regarding her results and if he feels she needs to have more tests done then she will see her primary care provider.  Refuses to schedule an appointment for lab/follow up with Kadi JIANG NP until she talks to her cardiologist.  She states awareness of her lab results and states they are all pretty much near or at normal.  States will discuss with her cardiologist. Alba Moncada RN

## 2019-09-24 NOTE — TELEPHONE ENCOUNTER
Aissatou informed of verbal orders ok for nursing orders as noted below    Ange Johnson BSN, RN, CPN

## 2019-09-24 NOTE — TELEPHONE ENCOUNTER
Aissatou would like to extended skilled nursing once a week for 4 weeks.  To continue to monitor cardio status thru cardio version appt.  Thank You.

## 2019-10-07 PROBLEM — I48.91 ATRIAL FIBRILLATION (H): Status: ACTIVE | Noted: 2019-01-01

## 2019-10-07 NOTE — PROGRESS NOTES
Subjective     Nan Doran is a 84 year old female who presents to clinic today for the following health issues:    HPI   ED/UC Followup:    Facility:  [facility]  Date of visit: 10/1/19  Reason for visit: cardioversion  Current Status: stable    Patient is a 85-year-old female with history of A. fib presents to clinic today for follow-up after recent procedure, cardioversion for her A. fib which was done on 10/1/2019.  Patient is accompanied to her daughter today.  She also has a history of gout.  She is on albuterol 100 mg daily to help prevent gout attacks.  She complains of left hand pain and swelling.  Interim History:  Care everywhere reviewed  Patient had Cardioversion on 10/1/2019. Cardioversion was performed at the bedside  Unfortunately the patient went back into atrial fibrillation prior to discharge.Per her cardiology , she was instructed toincrease her Amiodarone to 200 mg BID for 14 days and then back to 200 mg daily thereafter. She will follow up in 4 weeks time.       Patient would like to discuss gout on left hand, painful    Patient Active Problem List   Diagnosis     Gout     Hypertension goal BP (blood pressure) < 140/90     Advanced directives, counseling/discussion     Hyperlipidemia LDL goal <130     CKD (chronic kidney disease) stage 3, GFR 30-59 ml/min (H)     Macrocytic anemia     Acute kidney injury superimposed on chronic kidney disease (H)     Chronic atrial fibrillation     CHF (congestive heart failure) (H)     Chronic anticoagulation     Closed fracture of left ankle     Diarrhea     Displaced fracture of medial malleolus of unspecified tibia, initial encounter for closed fracture     Tophaceous gout     Left-sided weakness     History of CVA (cerebrovascular accident)     Atrial fibrillation (H)     Past Surgical History:   Procedure Laterality Date     GYN SURGERY      tubal     HC DUPLEX SCAN EXTRACRANIAL, LIMITED UNILATERAL      normal carotid ultrasound 8/2006     HEAD & NECK SURGERY       tonsils       Social History     Tobacco Use     Smoking status: Former Smoker     Last attempt to quit: 1984     Years since quittin.7     Smokeless tobacco: Never Used   Substance Use Topics     Alcohol use: No     Family History   Problem Relation Age of Onset     Cerebrovascular Disease Father      Diabetes Paternal Grandmother      Blood Disease Daughter         lupus     Blood Disease Daughter         lupus         Current Outpatient Medications   Medication Sig Dispense Refill     Acetaminophen 650 MG/20.3ML SOLN Take 20.3 mLs by mouth 3 times daily Also TID PRN       allopurinol (ZYLOPRIM) 100 MG tablet Take 1 tablet (100 mg) by mouth daily New to help prevent gout attacks 90 tablet 1     amiodarone (PACERONE/CODARONE) 200 MG tablet Take 200 mg by mouth daily One tab twice daily for 14 days then once daily       apixaban ANTICOAGULANT (ELIQUIS) 2.5 MG tablet Take 2.5 mg by mouth 2 times daily       calcium carbonate (TUMS) 500 MG chewable tablet Take 1 chew tab by mouth every 4 hours as needed for heartburn       colchicine (COLCYRS) 0.6 MG tablet Take 1 tablet (0.6 mg) by mouth daily for 3 days 3 tablet 0     ferrous sulfate (FEROSUL) 325 (65 Fe) MG tablet Take 1 tablet (325 mg) by mouth daily (with breakfast) Iron pill 90 tablet 0     lisinopril (PRINIVIL/ZESTRIL) 5 MG tablet Take 1 tablet (5 mg) by mouth daily       metoprolol tartrate (LOPRESSOR) 100 MG tablet Take 1 tablet (100 mg) by mouth daily For blood pressure and slows pulse 90 tablet 1     senna-docusate (SENOKOT-S/PERICOLACE) 8.6-50 MG tablet Take 1 tablet by mouth daily as needed for constipation       simethicone (MYLICON) 80 MG chewable tablet Take 80 mg by mouth 4 times daily as needed for flatulence or cramping       simvastatin (ZOCOR) 20 MG tablet Take 1 tablet (20 mg) by mouth At Bedtime For cholesterol 90 tablet 1     torsemide (DEMADEX) 20 MG tablet Take 1 tablet (20 mg) by mouth daily       Allergies   Allergen Reactions      Erythromycin      Recent Labs   Lab Test 09/04/19  1127 07/30/19 07/12/19 07/10/19 07/06/19 01/04/19  0857  04/19/16  0905 09/15/15  0830  06/12/14  0910  04/12/13  0816   LDL  --   --   --   --   --  42  --  53  --   --  62  --  86   HDL  --   --   --   --   --  44*  --  45*  --   --  40*  --  42*   TRIG  --   --   --   --   --  136  --  185*  --   --  191*  --  208*   ALT  --   --   --   --  30  --   --   --  19  --   --   --  18   CR  --  0.78 0.83  --   --  1.30*   < > 1.19* 1.38*   < > 1.22*   < > 1.36*   GFRESTIMATED  --  >60 >60  --   --  38*   < > 44* 37*   < > 43*   < > 38*   GFRESTBLACK  --  >60 >60  --   --  44*   < > 53* 45*   < > 52*   < > 46*   POTASSIUM  --  4.2 4.9  --   --  4.8   < > 4.4 4.2   < > 4.6   < > 4.5   TSH 5.19*  --   --  1.06  --   --   --   --   --   --   --   --   --     < > = values in this interval not displayed.      BP Readings from Last 3 Encounters:   10/07/19 112/69   09/04/19 102/57   08/18/19 106/72    Wt Readings from Last 3 Encounters:   10/07/19 57.2 kg (126 lb)   09/04/19 55.8 kg (123 lb)   08/12/19 60.3 kg (133 lb)                    Reviewed and updated as needed this visit by Provider  Tobacco  Allergies  Meds  Problems  Med Hx  Surg Hx  Fam Hx         Review of Systems   Constitutional: Negative for activity change, appetite change, diaphoresis and fatigue.   HENT: Negative for congestion, rhinorrhea, sinus pressure, sinus pain, sneezing and sore throat.    Eyes: Negative.    Respiratory: Negative for apnea, cough, choking, chest tightness, shortness of breath, wheezing and stridor.    Cardiovascular: Negative for chest pain, palpitations and peripheral edema.   Gastrointestinal: Negative for abdominal distention.   Endocrine: Negative.    Genitourinary: Negative.    Musculoskeletal: Positive for joint swelling. Negative for arthralgias and back pain.   Skin: Negative.  Negative for color change, pallor and rash.   Neurological: Negative.    Hematological:  Negative.    Psychiatric/Behavioral: Negative.             Objective    /69   Pulse 93   Temp 98.2  F (36.8  C) (Oral)   Wt 57.2 kg (126 lb)   SpO2 96%   BMI 25.45 kg/m    Body mass index is 25.45 kg/m .  Physical Exam  Vitals signs and nursing note reviewed.   Constitutional:       General: She is not in acute distress.     Appearance: Normal appearance. She is not ill-appearing, toxic-appearing or diaphoretic.   HENT:      Head: Normocephalic and atraumatic.      Nose: Nose normal. No congestion or rhinorrhea.   Eyes:      Pupils: Pupils are equal, round, and reactive to light.   Neck:      Musculoskeletal: Normal range of motion and neck supple.   Cardiovascular:      Rate and Rhythm: Rhythm irregular.      Heart sounds: No murmur. No friction rub. No gallop.    Pulmonary:      Effort: Pulmonary effort is normal. No respiratory distress.      Breath sounds: No stridor. No wheezing, rhonchi or rales.   Chest:      Chest wall: No tenderness.   Abdominal:      General: There is no distension.   Musculoskeletal: Normal range of motion.      Comments: Left hand slightly swollen, erythematous and tender,    Skin:     Capillary Refill: Capillary refill takes less than 2 seconds.      Coloration: Skin is not jaundiced.      Findings: No bruising, erythema or lesion.   Neurological:      General: No focal deficit present.      Mental Status: She is alert. Mental status is at baseline.      Cranial Nerves: No cranial nerve deficit.   Psychiatric:         Mood and Affect: Mood normal.         Behavior: Behavior normal.                    Assessment & Plan       Patient is a 85-year-old female with history of A. fib presents to clinic today for follow-up after recent procedure, cardioversion for her A. fib which was done on 10/1/2019.  Patient is accompanied to her daughter today.  .  1. Idiopathic gout, unspecified chronicity, unspecified site  Patient has history of gout.  She is on albuterol 100 mg daily to  help prevent gout attacks.  She complains of left hand pain and swelling  Prescribed colchicine 0.6 mg for 3 days.  Advised to use Tylenol as needed for pain.  If no improvement will consider giving short course of steroids, 20 mg for 3 days.  - colchicine (COLCYRS) 0.6 MG tablet; Take 1 tablet (0.6 mg) by mouth daily for 3 days  Dispense: 3 tablet; Refill: 0    2. Paroxysmal atrial fibrillation (H)  Patient had Cardioversion on 10/1/2019. Cardioversion was performed at the bedside  Unfortunately the patient went back into atrial fibrillation prior to discharge.   Per her cardiology , she was instructed to  increase her Amiodarone to 200 mg BID for 14 days and then back to 200 mg daily thereafter. She will follow up in 4 weeks time.   Today patient is doing well.  She is not in acute distress.  Alert oriented.  Her vital signs are stable.  Heart rate 93 regular.  She denies any shortness of breath, chest pain, palpitations, dizziness.  Continue current medication.  Will up with cardiology.           Return in about 3 months (around 1/7/2020) for Routine Visit.    Rafael Castro MD  [facility]

## 2019-10-09 NOTE — TELEPHONE ENCOUNTER
Patient states that she stopped the colchicine after the second dose.  She states had had onset of diarrhea; 3x during night and then all morning; states was all liquid.  Stopped by 11am.  She states had some crackers.  Has been eating just white rice throughout the day and drinking fluids frequently all day; small sips often.  Is urinating.  She states that she remembers getting diarrhea once before when taking colchicine.  Refuses to take any more of it.  She states that her hand does feel a little better.  Less tender and able to use it today.  Continuing the allopurinol.  FYI to MD. Alba Moncada RN

## 2019-10-09 NOTE — TELEPHONE ENCOUNTER
Patient states she is on a different medication for gout.  Also she has diarrhea.  Question is :  Should she be on all of this medication.  Ok to leave a message.  Thank You.

## 2019-10-09 NOTE — TELEPHONE ENCOUNTER
Patient is aware; has stopped colchicine, continuing allopurinol.  Drinking fluids frequently to stay hydrated.  Alba Moncada RN

## 2019-10-24 NOTE — TELEPHONE ENCOUNTER
Patient states she has head lice and is going to use the shampoo for lice.  Should she still keep her appt today or should she reschedule.  Please call.  Thank You.

## 2019-10-24 NOTE — TELEPHONE ENCOUNTER
Pt was seen in urgent care on 10/19/19 for same sx.  She now thinks it may be lice and will try otc treatment.  She requests appointment today be cancelled and if it does not improve she will call and schedule an appointment.  Savi GARCIAN, RN

## 2019-10-28 NOTE — LETTER
October 29, 2019      Nan Doran  [address] [facility] [address]        Dear ,    We are writing to inform you of your test results. Your glucose was slightly high (but it is nonfasting). nYour GFR is 33 which is another decrease. Your creatinine was high. Also your hemoglobin is low (as it was in the last few months). I know you had stopped taking your iron supplement because they were being hard on your stomach and had stated in previous visit that you were going to follow up with cardiology prior to starting them again. I want to make sure you do follow up regarding this low hemoglobin as well.    Resulted Orders   Basic metabolic panel  (Ca, Cl, CO2, Creat, Gluc, K, Na, BUN)   Result Value Ref Range    Sodium 138 133 - 144 mmol/L    Potassium 4.2 3.4 - 5.3 mmol/L    Chloride 103 94 - 109 mmol/L    Carbon Dioxide 28 20 - 32 mmol/L    Anion Gap 7 3 - 14 mmol/L    Glucose 121 (H) 70 - 99 mg/dL      Comment:      Non Fasting    Urea Nitrogen 32 (H) 7 - 30 mg/dL    Creatinine 1.47 (H) 0.52 - 1.04 mg/dL    GFR Estimate 33 (L) >60 mL/min/[1.73_m2]      Comment:      Non  GFR Calc  Starting 12/18/2018, serum creatinine based estimated GFR (eGFR) will be   calculated using the Chronic Kidney Disease Epidemiology Collaboration   (CKD-EPI) equation.      GFR Estimate If Black 38 (L) >60 mL/min/[1.73_m2]      Comment:       GFR Calc  Starting 12/18/2018, serum creatinine based estimated GFR (eGFR) will be   calculated using the Chronic Kidney Disease Epidemiology Collaboration   (CKD-EPI) equation.      Calcium 9.6 8.5 - 10.1 mg/dL   CBC with platelets and differential   Result Value Ref Range    WBC 7.5 4.0 - 11.0 10e9/L    RBC Count 3.38 (L) 3.8 - 5.2 10e12/L      Comment:      Specimen verified by repeat testing    Hemoglobin 9.5 (L) 11.7 - 15.7 g/dL      Comment:      Specimen verified by repeat testing    Hematocrit 33.5 (L) 35.0 - 47.0 %      Comment:      Specimen  verified by repeat testing    MCV 99 78 - 100 fl    MCH 28.1 26.5 - 33.0 pg    MCHC 28.4 (L) 31.5 - 36.5 g/dL      Comment:      Specimen verified by repeat testing    RDW 16.3 (H) 10.0 - 15.0 %      Comment:      Specimen verified by repeat testing    Platelet Count 313 150 - 450 10e9/L    % Neutrophils 65.0 %    % Lymphocytes 20.0 %    % Monocytes 11.0 %    % Eosinophils 3.0 %    % Basophils 1.0 %    Absolute Neutrophil 4.9 1.6 - 8.3 10e9/L    Absolute Lymphocytes 1.5 0.8 - 5.3 10e9/L    Absolute Monocytes 0.8 0.0 - 1.3 10e9/L    Absolute Eosinophils 0.2 0.0 - 0.7 10e9/L    Absolute Basophils 0.1 0.0 - 0.2 10e9/L    Anisocytosis Moderate     Polychromasia Slight Increase     Reactive Lymphs Present     Hypochromasia Present     Platelet Estimate       Automated count confirmed.  Platelet morphology is normal.      Comment:      Normal    Diff Method Automated Method      If you have any questions or concerns, please call the clinic at the number listed above.     Sincerely,      LOVE Onofre CNP

## 2019-10-28 NOTE — LETTER
October 29, 2019    Nan Doran  [address] [facility] [address]            Dear Nan,    I got your remaining labs back. The glucose was slightly high (but it is nonfasting)   Your gfr is 33 which is another decrease. Your creatinine was high.   Did you get a follow up appointment with primary care scheduled? I would recommend you do that this week to address your leg swelling. Any other questions please let me know.     Below is a copy of the results.  It was a pleasure to see you at your last appointment.    If you have any questions or concerns, please call myself or my nurse at [phone].    Sincerely,    Elizabeth Johnson PA-C    Results for orders placed or performed in visit on 10/28/19   Basic metabolic panel  (Ca, Cl, CO2, Creat, Gluc, K, Na, BUN)   Result Value Ref Range    Sodium 138 133 - 144 mmol/L    Potassium 4.2 3.4 - 5.3 mmol/L    Chloride 103 94 - 109 mmol/L    Carbon Dioxide 28 20 - 32 mmol/L    Anion Gap 7 3 - 14 mmol/L    Glucose 121 (H) 70 - 99 mg/dL    Urea Nitrogen 32 (H) 7 - 30 mg/dL    Creatinine 1.47 (H) 0.52 - 1.04 mg/dL    GFR Estimate 33 (L) >60 mL/min/[1.73_m2]    GFR Estimate If Black 38 (L) >60 mL/min/[1.73_m2]    Calcium 9.6 8.5 - 10.1 mg/dL   CBC with platelets and differential   Result Value Ref Range    WBC 7.5 4.0 - 11.0 10e9/L    RBC Count 3.38 (L) 3.8 - 5.2 10e12/L    Hemoglobin 9.5 (L) 11.7 - 15.7 g/dL    Hematocrit 33.5 (L) 35.0 - 47.0 %    MCV 99 78 - 100 fl    MCH 28.1 26.5 - 33.0 pg    MCHC 28.4 (L) 31.5 - 36.5 g/dL    RDW 16.3 (H) 10.0 - 15.0 %    Platelet Count 313 150 - 450 10e9/L    % Neutrophils 65.0 %    % Lymphocytes 20.0 %    % Monocytes 11.0 %    % Eosinophils 3.0 %    % Basophils 1.0 %    Absolute Neutrophil 4.9 1.6 - 8.3 10e9/L    Absolute Lymphocytes 1.5 0.8 - 5.3 10e9/L    Absolute Monocytes 0.8 0.0 - 1.3 10e9/L    Absolute Eosinophils 0.2 0.0 - 0.7 10e9/L    Absolute Basophils 0.1 0.0 - 0.2 10e9/L    Anisocytosis Moderate     Polychromasia  Slight Increase     Reactive Lymphs Present     Hypochromasia Present     Platelet Estimate       Automated count confirmed.  Platelet morphology is normal.    Diff Method Automated Method

## 2019-10-28 NOTE — PROGRESS NOTES
"SUBJECTIVE:   Nan Doran is a 84 year old female presenting with a chief complaint of cough - non-productive.  Onset of symptoms was 1 week(s) ago.  Course of illness is same.    Severity mild  No fever or other cold symptoms.  Denies chest pain sob     Also reports increased weight and swelling in lower legs  She does have hx CHF, Chronic kidney disease last GFR was 39 1 month ago.     Also reports \"itch\" sensation all over body 2-3 months. No rash  Hasn't taken anything for it  No new medications or foods, no products. Denies difficulty breathing, swallowing or facial lip swelling.     Also needs more gout medication      Past Medical History:   Diagnosis Date     Gout      HTN      Hypercholesteremia      Hyperglycemia      Current Outpatient Medications   Medication Sig Dispense Refill     acetaminophen (TYLENOL) 500 MG tablet Take 500 mg by mouth every 6 hours as needed for mild pain       allopurinol (ZYLOPRIM) 100 MG tablet Take 1 tablet (100 mg) by mouth daily New to help prevent gout attacks 90 tablet 1     apixaban ANTICOAGULANT (ELIQUIS) 2.5 MG tablet Take 2.5 mg by mouth 2 times daily       calcium carbonate (TUMS) 500 MG chewable tablet Take 1 chew tab by mouth every 4 hours as needed for heartburn       lisinopril (PRINIVIL/ZESTRIL) 5 MG tablet Take 1 tablet (5 mg) by mouth daily 30 tablet 0     metoprolol tartrate (LOPRESSOR) 100 MG tablet Take 1 tablet (100 mg) by mouth daily For blood pressure and slows pulse 90 tablet 1     simvastatin (ZOCOR) 20 MG tablet Take 1 tablet (20 mg) by mouth At Bedtime For cholesterol 90 tablet 1     torsemide (DEMADEX) 20 MG tablet Take 1 tablet (20 mg) by mouth daily       amiodarone (PACERONE/CODARONE) 200 MG tablet Take 100 mg by mouth daily One tab twice daily for 14 days then once daily       ferrous sulfate (FEROSUL) 325 (65 Fe) MG tablet Take 1 tablet (325 mg) by mouth daily (with breakfast) Iron pill (Patient not taking: Reported on 10/28/2019) 90 tablet 0     " senna-docusate (SENOKOT-S/PERICOLACE) 8.6-50 MG tablet Take 1 tablet by mouth daily as needed for constipation       simethicone (MYLICON) 80 MG chewable tablet Take 80 mg by mouth 4 times daily as needed for flatulence or cramping       Social History     Tobacco Use     Smoking status: Former Smoker     Last attempt to quit: 1984     Years since quittin.8     Smokeless tobacco: Never Used   Substance Use Topics     Alcohol use: No       ROS:  Review of systems negative except as stated above.    OBJECTIVE:  /62   Pulse 79   Temp 97.2  F (36.2  C) (Tympanic)   Wt 59.4 kg (131 lb)   SpO2 100%   Breastfeeding? No   BMI 26.46 kg/m    GENERAL APPEARANCE: alert and no distress  EYES: EOMI,  PERRL, conjunctiva clear  HENT: ear canals and TM's normal.  Nose and mouth without ulcers, erythema or lesions  NECK: supple, nontender, no lymphadenopathy  RESP: lungs clear to auscultation - no rales, rhonchi or wheezes  CV: regular rates and rhythm, normal S1 S2, no murmur noted  ABDOMEN:  soft, nontender, no HSM or masses and bowel sounds normal  Extremities: no peripheral edema or tenderness, peripheral pulses normal  NEURO: Normal strength and tone, sensory exam grossly normal,  normal speech and mentation  SKIN: no rash noted, has some bruising on left arm and right leg  LYMPHATICS: no cervical adenopathy      IMPRESSION: Increased bibasilar atelectasis and/or infiltrate since  comparison study. The cardiac silhouette is not enlarged. Pulmonary  vasculature is unremarkable.      Component      Latest Ref Rng & Units 10/28/2019   WBC      4.0 - 11.0 10e9/L 7.5   RBC Count      3.8 - 5.2 10e12/L 3.38 (L)   Hemoglobin      11.7 - 15.7 g/dL 9.5 (L)   Hematocrit      35.0 - 47.0 % 33.5 (L)   MCV      78 - 100 fl 99   MCH      26.5 - 33.0 pg 28.1   MCHC      31.5 - 36.5 g/dL 28.4 (L)   RDW      10.0 - 15.0 % 16.3 (H)   Platelet Count      150 - 450 10e9/L 313   % Neutrophils      % 65.0   % Lymphocytes      %  20.0   % Monocytes      % 11.0   % Eosinophils      % 3.0   % Basophils      % 1.0   Absolute Neutrophil      1.6 - 8.3 10e9/L 4.9   Absolute Lymphocytes      0.8 - 5.3 10e9/L 1.5   Absolute Monocytes      0.0 - 1.3 10e9/L 0.8   Absolute Eosinophils      0.0 - 0.7 10e9/L 0.2   Absolute Basophils      0.0 - 0.2 10e9/L 0.1   Anisocytosis       Moderate   Polychromasia       Slight Increase   Reactive Lymphs       Present   Hypochromasia       Present   Platelet Estimate       Automated count confirmed.  Platelet morphology is normal.   Diff Method       Automated Method     ASSESSMENT:  (J18.9) Pneumonia due to infectious organism, unspecified laterality, unspecified part of lung  (primary encounter diagnosis)  Plan: Doxycyline 100 mg BID X 10 days  Fluids, rest otc as directed  Follow up for resolution.   To ER if emergent symptoms as discussed    (M79.89) Leg swelling  Plan: BMP pending, to follow up with pcp regarding leg swelling and weight gain (likely water weight)   Noted up 5 pounds from 10/7.   Denies chest pain, sob.     (L29.9) Itch of skin  Plan: cetirizine (ZYRTEC) 10 MG tablet BID X 30 days,    ALLERGY/ASTHMA ADULT REFERRAL to follow up if not improving or new worsening symptoms       (M10.00) Idiopathic gout, unspecified chronicity, unspecified site  Plan:   Refilled- allopurinol (ZYLOPRIM) 100 MG tablet 1 month        LOVE Onofre CNP

## 2019-10-31 NOTE — TELEPHONE ENCOUNTER
Reason for Call:  Other vomiting    Detailed comments: after taking her medication this AM patient vomited she is wondering if she needs to take any of these again today  Please call to advice  Thank you       Phone Number Patient can be reached at: Home number on file [phone] (home)    Best Time:     Can we leave a detailed message on this number? YES    Call taken on 10/31/2019 at 1:15 PM by Karen Sanchez

## 2019-10-31 NOTE — TELEPHONE ENCOUNTER
Pt states she took her morning medications today and then threw up one hour later.  The pills were not in pill form in the vomit.  Pills taken were:  Allopurinol  Amiodarone  Eliquis  Zyrtec  Lisinopril  Metoprolol  Simvastatin  Torsemide  Doxycyline    Pt advised that Doxycyline can be hard on your stomach and should not be taken with milk and she should stay upright after taking it for at least 30 min.      To providers to advise if she needs to do anything else about the pills she vomited up.    Savi GARCIAN, RN.

## 2019-11-01 NOTE — TELEPHONE ENCOUNTER
I would not recommend taking any additional medications at this time due to the vomiting.  Continue with regular medication dosing schedule.     Kadi Leiva, GREG

## 2019-11-04 NOTE — TELEPHONE ENCOUNTER
Pt notified of provider message as written.  Pt states she feels nauseous about one hour after taking her mediations most mornings, she has thrown up now twice recently after taking her medications.  Pt often has a burning sensation in her esophagus.  Pt is getting frustrated with this and would like to try to determine what is causing this sx and get it to stop.    To provider to advise.  Savi GARCIAN, RN

## 2019-11-05 NOTE — TELEPHONE ENCOUNTER
Patient has been taking medication with toast and milk.  Patient instructions advise not take medication with dairy.  Patient is sitting up after taking medication.    Patient reports continues to cough and her leg and feet are still swollen. Patient is speaking in full clear sentences.  Cough not identified on the phone.   Patient wonders if she can switch antibiotic.  Patient states she has had antibiotics in past and did not have nausea/vomiting.  Patient would like to switch antibiotic.      Wt Readings from Last 5 Encounters:   10/28/19 59.4 kg (131 lb)   10/07/19 57.2 kg (126 lb)   09/04/19 55.8 kg (123 lb)   08/12/19 60.3 kg (133 lb)   08/01/19 59.8 kg (131 lb 12.8 oz)     Patient has h/o CHF.  Advise evaluation.   An appointment is schedule for today with Dr Elin Claire.  Patient/parent verbalized understanding of instructions provided and agreed with the plan of care    Next 5 appointments (look out 90 days)    Nov 05, 2019  1:20 PM CST  [facility] [address] [facility] [address]  [phone]   Nov 07, 2019  2:40 PM CST  [facility] Rafael Castro [facility]  [facility] [address] [facility] [address]  [phone]        Patient/parent verbalized understanding of instructions provided and agreed with the plan of care  Yumiko Bowling RN       .

## 2019-11-05 NOTE — PROGRESS NOTES
SUBJECTIVE:  Nan Doran is an 84 year old female who presents for swelling in ankles and feet for about 10 days.  Initially would improve with elevation, but now not improving.  Has cough and like sometimes hard to catch breath after coughing.  Not waking up with shortness of breath.  Cough seems triggered by coughing. Was seen on 10/28/19 and started on doxycycline but did not complete it because it bothered her stomach and led to nausea and vomiting.  Didn't notice a difference with the doxycycline.  No recent travel.  No known exposures.  Not notices any changes in heart rate or any skipped beats.  Weight on 10/7/19 was 126, on 10/28/19 was 131, and weight today is 133.    PMH:  Patient Active Problem List   Diagnosis     Gout     Hypertension goal BP (blood pressure) < 140/90     Advanced directives, counseling/discussion     Hyperlipidemia LDL goal <130     CKD (chronic kidney disease) stage 3, GFR 30-59 ml/min (H)     Macrocytic anemia     Acute kidney injury superimposed on chronic kidney disease (H)     Chronic atrial fibrillation     CHF (congestive heart failure) (H)     Chronic anticoagulation     Closed fracture of left ankle     Diarrhea     Displaced fracture of medial malleolus of unspecified tibia, initial encounter for closed fracture     Tophaceous gout     Left-sided weakness     History of CVA (cerebrovascular accident)     Atrial fibrillation (H)     Social History     Socioeconomic History     Marital status:      Spouse name: Not on file     Number of children: Not on file     Years of education: Not on file     Highest education level: Not on file   Occupational History     Not on file   Social Needs     Financial resource strain: Not on file     Food insecurity:     Worry: Not on file     Inability: Not on file     Transportation needs:     Medical: Not on file     Non-medical: Not on file   Tobacco Use     Smoking status: Former Smoker     Last attempt to quit: 1/1/1984     Years  since quittin.8     Smokeless tobacco: Never Used   Substance and Sexual Activity     Alcohol use: No     Drug use: No     Sexual activity: Not Currently     Partners: Male   Lifestyle     Physical activity:     Days per week: Not on file     Minutes per session: Not on file     Stress: Not on file   Relationships     Social connections:     Talks on phone: Not on file     Gets together: Not on file     Attends Alevism service: Not on file     Active member of club or organization: Not on file     Attends meetings of clubs or organizations: Not on file     Relationship status: Not on file     Intimate partner violence:     Fear of current or ex partner: Not on file     Emotionally abused: Not on file     Physically abused: Not on file     Forced sexual activity: Not on file   Other Topics Concern     Parent/sibling w/ CABG, MI or angioplasty before 65F 55M? Yes     Comment: daugthers X2 lupus related   Social History Narrative     Not on file     Family History   Problem Relation Age of Onset     Cerebrovascular Disease Father      Diabetes Paternal Grandmother      Blood Disease Daughter         lupus     Blood Disease Daughter         lupus       ALLERGIES:  Erythromycin    Current Outpatient Medications   Medication     acetaminophen (TYLENOL) 500 MG tablet     allopurinol (ZYLOPRIM) 100 MG tablet     amiodarone (PACERONE/CODARONE) 200 MG tablet     apixaban ANTICOAGULANT (ELIQUIS) 2.5 MG tablet     calcium carbonate (TUMS) 500 MG chewable tablet     cetirizine (ZYRTEC) 10 MG tablet     doxycycline hyclate (VIBRAMYCIN) 100 MG capsule     lisinopril (PRINIVIL/ZESTRIL) 5 MG tablet     metoprolol tartrate (LOPRESSOR) 100 MG tablet     simvastatin (ZOCOR) 20 MG tablet     torsemide (DEMADEX) 20 MG tablet     No current facility-administered medications for this visit.          ROS:  ROS is done and is negative for general/constitutional, eye, ENT, Respiratory, cardiovascular, GI, , Skin, musculoskeletal  except as noted elsewhere.  All other review of systems negative except as noted elsewhere.      OBJECTIVE:  BP 93/59   Pulse 85   Temp 98.3  F (36.8  C) (Oral)   Wt 60.3 kg (133 lb)   SpO2 99%   BMI 26.86 kg/m    GENERAL APPEARANCE: Alert, in no acute distress  EYES: normal  NOSE:normal  OROPHARYNX:normal  NECK:No adenopathy,masses or thyromegaly  RESP: no increased wob.  Occasional fine bibasilar crackles  CV:regular rate and rhythm and no murmurs, clicks, or gallops  ABDOMEN: Abdomen soft, non-tender. BS normal. No masses, organomegaly  SKIN: no ulcers, lesions or rash  LEGS: non-tender.  1+ pitting edema of ankles and proximal feet bilaterally      RESULTS  No results found for any visits on 11/05/19..  No results found for this or any previous visit (from the past 48 hour(s)).    ASSESSMENT/PLAN:    ASSESSMENT / PLAN:  (I50.9) Congestive heart failure, unspecified HF chronicity, unspecified heart failure type (H)  (primary encounter diagnosis)  Comment: current resp and leg sxs c/w chf.  chf may have been exacerbated by recent resp infection.  Currently sxs and exam are not c/w pneumonia  Plan: will treat with added 10 mg daily of demadex for five days only. .Reviewed medication instructions and side effects. Follow up if experiences side effects.. I reviewed supportive care, otc meds to use if needed, expected course, and signs of concern.  Follow up within 1 week(s) for recheck or sooner if worsens in any way.  Reviewed red flag symptoms and is to go to the ER if experiences any of these.    (M25.471,  M25.472) Ankle edema, bilateral  Comment: currently c/w chf  Plan: torsemide (DEMADEX) 10 MG tablet        will treat with added 10 mg daily of demadex for five days only. .Reviewed medication instructions and side effects. Follow up if experiences side effects.. I reviewed supportive care, otc meds to use if needed, expected course, and signs of concern.  Follow up within 1 week(s) for recheck or sooner  if worsens in any way.  Reviewed red flag symptoms and is to go to the ER if experiences any of these.    (R06.02) SOB (shortness of breath)  Comment: currently most c/w chf.  Currently not finding evidence of infectious etiology for her sxs  Plan: treat chf as above    (N18.3) CKD (chronic kidney disease) stage 3, GFR 30-59 ml/min (H)  Comment: pt has h/o ckd and last Cr was 1.47, so f/u needed to monitor kidney status as chf is treated  Plan: I reviewed supportive care, otc meds to use if needed, expected course, and signs of concern.  Follow up within 1 week(s) for recheck or sooner if worsens in any way.  Reviewed red flag symptoms and is to go to the ER if experiences any of these.    Instructions given to both pt and her dtr.    See [facility] for orders, medications, letters, patient instructions    Elin Rizvi M.D.

## 2019-11-05 NOTE — TELEPHONE ENCOUNTER
Case Management Discharge Note    Final Note: Pt to be DC'd to skilled bed at [facility].    Discharge Placement     Facility/Agency Request Status Selected? Address Phone Number Fax Number    [facility] Accepted    Yes [address] , [facility] [address] [phone]        Shelby Walker, RN 3/1/2018 13:44    Gabby is following                   Other:  (Private auto)    Discharge Codes: 03  Discharged/transferred to skilled nursing facility (SNF) with Medicare certification in anticipation of skilled care ([facility])   This is likely the doxycycline causing the nausea/vomiting.  Could improve if she takes it with food or may need to stop the med.

## 2019-11-12 NOTE — PROGRESS NOTES
SUBJECTIVE:  Nan Doran is an 84 year old female who presents for leg swelling.  Was seen last week and had increased dose of her water pill but she didn't think it helped.  Having swelling when on feet for a while.  No swelling in morning when gets out of bed.  Swelling decreases if puts feet up for an hour.  No pain form swelling.  Has h/o afib but has not noticed heart beating fast or skipping.  Breathing has been okay.  Has had a little intermittent cough.    PMH:   has a past medical history of Gout, HTN, Hypercholesteremia, and Hyperglycemia.  Patient Active Problem List   Diagnosis     Gout     Hypertension goal BP (blood pressure) < 140/90     Advanced directives, counseling/discussion     Hyperlipidemia LDL goal <130     CKD (chronic kidney disease) stage 3, GFR 30-59 ml/min (H)     Macrocytic anemia     Acute kidney injury superimposed on chronic kidney disease (H)     Chronic atrial fibrillation     CHF (congestive heart failure) (H)     Chronic anticoagulation     Closed fracture of left ankle     Diarrhea     Displaced fracture of medial malleolus of unspecified tibia, initial encounter for closed fracture     Tophaceous gout     Left-sided weakness     History of CVA (cerebrovascular accident)     Atrial fibrillation (H)     Social History     Socioeconomic History     Marital status:      Spouse name: Not on file     Number of children: Not on file     Years of education: Not on file     Highest education level: Not on file   Occupational History     Not on file   Social Needs     Financial resource strain: Not on file     Food insecurity:     Worry: Not on file     Inability: Not on file     Transportation needs:     Medical: Not on file     Non-medical: Not on file   Tobacco Use     Smoking status: Former Smoker     Last attempt to quit: 1984     Years since quittin.8     Smokeless tobacco: Never Used   Substance and Sexual Activity     Alcohol use: No     Drug use: No     Sexual  activity: Not Currently     Partners: Male   Lifestyle     Physical activity:     Days per week: Not on file     Minutes per session: Not on file     Stress: Not on file   Relationships     Social connections:     Talks on phone: Not on file     Gets together: Not on file     Attends Gnosticist service: Not on file     Active member of club or organization: Not on file     Attends meetings of clubs or organizations: Not on file     Relationship status: Not on file     Intimate partner violence:     Fear of current or ex partner: Not on file     Emotionally abused: Not on file     Physically abused: Not on file     Forced sexual activity: Not on file   Other Topics Concern     Parent/sibling w/ CABG, MI or angioplasty before 65F 55M? Yes     Comment: daugthers X2 lupus related   Social History Narrative     Not on file     Family History   Problem Relation Age of Onset     Cerebrovascular Disease Father      Diabetes Paternal Grandmother      Blood Disease Daughter         lupus     Blood Disease Daughter         lupus       ALLERGIES:  Doxycycline and Erythromycin    Current Outpatient Medications   Medication     acetaminophen (TYLENOL) 500 MG tablet     allopurinol (ZYLOPRIM) 100 MG tablet     amiodarone (PACERONE/CODARONE) 200 MG tablet     apixaban ANTICOAGULANT (ELIQUIS) 2.5 MG tablet     calcium carbonate (TUMS) 500 MG chewable tablet     cetirizine (ZYRTEC) 10 MG tablet     lisinopril (PRINIVIL/ZESTRIL) 5 MG tablet     metoprolol tartrate (LOPRESSOR) 100 MG tablet     simvastatin (ZOCOR) 20 MG tablet     torsemide (DEMADEX) 20 MG tablet     torsemide (DEMADEX) 10 MG tablet     No current facility-administered medications for this visit.          ROS:  ROS is done and is negative for general/constitutional, eye, ENT, Respiratory, cardiovascular, GI, , Skin, musculoskeletal except as noted elsewhere.  All other review of systems negative except as noted elsewhere.      OBJECTIVE:  BP 97/63   Pulse 80   Temp  97.7  F (36.5  C) (Oral)   Resp 16   Wt 58.5 kg (129 lb)   SpO2 100%   BMI 26.05 kg/m    GENERAL APPEARANCE: Alert, in no acute distress  EYES: normal  NOSE:normal  OROPHARYNX:normal  NECK:No adenopathy,masses or thyromegaly  RESP: normal and clear to auscultation  CV:regular rate and rhythm  ABDOMEN: Abdomen soft, non-tender. BS normal. No masses, organomegaly  SKIN: scattered patches of mild erythema which are mildly dry on arms and legs with some excoriation marks noted  LEGS: 1+ edema of ankles and proximal feet, slightly more on left than right.      RESULTS  No results found for any visits on 11/12/19..  No results found for this or any previous visit (from the past 48 hour(s)).    ASSESSMENT/PLAN:    ASSESSMENT / PLAN:  (M25.473) Ankle edema  (primary encounter diagnosis)  Comment: hx is c/w dependant edema and currently not having other signs or sxs of chf  Plan: Renal panel        Pt to continue her current meds.  Will not increased diuretic as temporary increase last week did not appear to help.  Check renal panel today.  Is possible her medication changes could have contributed to edema.  Is to f/u with cardiology within 6 weeks for recheck    (I10) Hypertension goal BP (blood pressure) < 140/90  Comment:   Plan: lisinopril (PRINIVIL/ZESTRIL) 5 MG tablet,         Renal panel        Med refill done for 1 month.  F/u with pcp    (L29.9) Itch of skin  Comment: pt has some triamcinolone cream at home from a recent appt but hasn't been using it  Plan: pt to use the triamcinolone cream bid and continue with zyrtec.  I reviewed supportive care, otc meds to use if needed, expected course, and signs of concern.  Follow up as needed or if she does not improve within 2 week(s) or if worsens in any way.  Reviewed red flag symptoms and is to go to the ER if experiences any of these.    (M10.9) Gout, unspecified cause, unspecified chronicity, unspecified site  Comment: has h/o and has ongoing sxs in feet.   Discussed options with her and for now will increase allopurinol to 200 mg daily to see if improves sxs  Plan: allopurinol (ZYLOPRIM) 100 MG tablet        Reviewed medication instructions and side effects. Follow up if experiences side effects.. I reviewed supportive care, otc meds to use if needed, expected course, and signs of concern.  Follow up with pcp within 1 month(s) or sooner if worsens in any way.  Reviewed red flag symptoms and is to go to the ER if experiences any of these.      See [facility] for orders, medications, letters, patient instructions    Elin Rizvi M.D.

## 2019-11-20 NOTE — PATIENT INSTRUCTIONS
- limit sodium intake.   - elevate legs when sitting.  - try bilateral knee high compression socks/stockings.  Put on in the morning when legs are least swollen and take off at bedtime.    - follow-up with swelling at Cardiology appointment.

## 2019-11-20 NOTE — LETTER
"My Heart Failure Action Plan   Name: Nan Doran    YOB: 1935   Date: 11/20/2019    My doctor: Naga Russell     [place] [address] [facility] [address] [phone]  My Diagnosis: {HF STAGES:270790}   My Exercise Goal: 30 minutes daily  .     My Weight Plan:   Wt Readings from Last 2 Encounters:   11/12/19 58.5 kg (129 lb)   11/05/19 60.3 kg (133 lb)     Weigh yourself daily using the same scale. If you gain more than 2 pounds in 24 hours or 5 pounds in a week {HF WEIGHT GOAL:146913}    My Diet Goal: { :734396::\"No added salt\"}    Emergency Room Visits:    Our goal is to improve your quality of life and help you avoid a visit to the emergency room or hospital.  If we work together, we can achieve this goal. But, if you feel you need to call 911 or go to the emergency room, please do so.  If you go to the emergency room, please bring your list of medicines and your daily weight chart with you.       GREEN ZONE     Doing well today    Weight gained is no more than 2 pounds a day or 5 pounds a week.    No swelling in feet, ankles, legs or stomach.    No more swelling than usual.    No more trouble breathing than usual.    No change in my sleep.    No other problems. Actions:    I am doing fine.  I will take my medicine, follow my diet, see my doctor, exercise, and watch for symptoms.           YELLOW ZONE         Having a bad day or flare up    Weight gain of more than 2 pounds in one day or 5 pounds in one week.    New swelling in ankle, leg, knee or thigh.    Bloating in belly, pants feel tighter.    Swelling in hands or face.    Coughing or trouble breathing while walking or talking.    Harder to breathe last night.    Have trouble sleeping, wake up short of breath.    Much more tired than usual.    Not eating.    Pain in my chest or bad leg cramps.    Feel weak or dizzy. Actions:    I need to take action and call my doctor or nurse today.                 RED " ZONE         Need medical care now    Weight gain of 5 pounds overnight.    Chest pain or pressure that does not go away.    Feel less alert.    Wheezing or have trouble breathing when at rest.    Cannot sleep lying down.    Cannot take my water pill.    Pass out or faint. Actions:    I need to call my doctor or nurse now!    Call 911 if I have chest pain or cannot breathe.

## 2019-11-20 NOTE — PROGRESS NOTES
Subjective     Nan Doran is a 84 year old female who presents to clinic today for the following health issues:    JACQUES Montana is a 84 y.o female with a PMH of HTN, Atrial Fib, CKD 3, and CHF who presents for follow-up of lower extremity edema.  She was seen in the clinic 11/5 and temporarily had Torsemide increased to 10 mg x 5 days, which according to chart did not provide any relief of swelling at appt last week.  She was advised last week to f/u with Cardiology.  Labs revealed stable electrolytes, Creatinine 1.50, which is consistent for her.    Today she reports ongoing swelling in bilateral lower extremities.  Reports for the past month she has been sewing a lot in prep for a craft show and has legs dangling for up to a few hours at a time.  States legs look normal with no swelling when she first wakes up and then gets worse throughout the day.  Swelling does improve if she elevates legs on couch.  She does add salt to her food.  Has not tried any compression stockings.  She is scheduled to see her Cardiologist on 12/13/19.  She is also requesting to see Podiatry for routine foot care.    She also reports ongoing itching of arms and chest.  This has been present for the past month.   Reports itching worse when she is hot.  She has tried Triamcinolone with no improvement.  Feels Zyrtec does help some.  Mild improvement with Calamine lotion.  Denies any new soap or lotions.  She is requesting referral to Allergist.         Patient Active Problem List   Diagnosis     Gout     Hypertension goal BP (blood pressure) < 140/90     Advanced directives, counseling/discussion     Hyperlipidemia LDL goal <130     CKD (chronic kidney disease) stage 3, GFR 30-59 ml/min (H)     Macrocytic anemia     Acute kidney injury superimposed on chronic kidney disease (H)     Chronic atrial fibrillation     CHF (congestive heart failure) (H)     Chronic anticoagulation     Closed fracture of left ankle     Diarrhea     Displaced  fracture of medial malleolus of unspecified tibia, initial encounter for closed fracture     Tophaceous gout     Left-sided weakness     History of CVA (cerebrovascular accident)     Atrial fibrillation (H)     Past Surgical History:   Procedure Laterality Date     GYN SURGERY      tubal     HC DUPLEX SCAN EXTRACRANIAL, LIMITED UNILATERAL      normal carotid ultrasound 2006     HEAD & NECK SURGERY      tonsils       Social History     Tobacco Use     Smoking status: Former Smoker     Last attempt to quit: 1984     Years since quittin.9     Smokeless tobacco: Never Used   Substance Use Topics     Alcohol use: No     Family History   Problem Relation Age of Onset     Cerebrovascular Disease Father      Diabetes Paternal Grandmother      Blood Disease Daughter         lupus     Blood Disease Daughter         lupus         Current Outpatient Medications   Medication Sig Dispense Refill     allopurinol (ZYLOPRIM) 100 MG tablet Take 2 tablets (200 mg) by mouth daily 60 tablet 0     amiodarone (PACERONE/CODARONE) 200 MG tablet Take 100 mg by mouth daily One tab once daily for 14 days then once daily       apixaban ANTICOAGULANT (ELIQUIS) 2.5 MG tablet Take 2.5 mg by mouth 2 times daily       calcium carbonate (TUMS) 500 MG chewable tablet Take 1 chew tab by mouth every 4 hours as needed for heartburn       cetirizine (ZYRTEC) 10 MG tablet Take 1 tablet (10 mg) by mouth daily 90 tablet 1     lisinopril (PRINIVIL/ZESTRIL) 5 MG tablet Take 1 tablet (5 mg) by mouth daily 30 tablet 0     metoprolol tartrate (LOPRESSOR) 100 MG tablet Take 1 tablet (100 mg) by mouth daily For blood pressure and slows pulse 90 tablet 1     order for DME Equipment being ordered: bilateral knee high compression socks 1 Device 0     simvastatin (ZOCOR) 20 MG tablet Take 1 tablet (20 mg) by mouth At Bedtime For cholesterol 90 tablet 1     torsemide (DEMADEX) 20 MG tablet Take 1 tablet (20 mg) by mouth daily       acetaminophen (TYLENOL) 500  MG tablet Take 500 mg by mouth every 6 hours as needed for mild pain       Allergies   Allergen Reactions     Doxycycline GI Disturbance     Erythromycin        Reviewed and updated as needed this visit by Provider  Tobacco  Allergies  Meds  Problems  Med Hx  Surg Hx  Fam Hx         Review of Systems   Constitutional: Negative for chills and fever.   Respiratory: Negative for cough and shortness of breath.    Cardiovascular: Positive for peripheral edema. Negative for chest pain.   Gastrointestinal: Negative for abdominal pain.   Skin: Positive for rash.   Neurological: Negative for dizziness and headaches.            Objective    BP 99/61   Pulse 59   Temp 97.3  F (36.3  C) (Oral)   Wt 60 kg (132 lb 3.2 oz)   SpO2 (P) 100%   BMI 26.70 kg/m    Body mass index is 26.7 kg/m .  Physical Exam  Vitals signs reviewed.   Constitutional:       Appearance: She is well-developed.   HENT:      Head: Normocephalic.   Cardiovascular:      Rate and Rhythm: Rhythm irregularly irregular.   Pulmonary:      Effort: Pulmonary effort is normal.      Breath sounds: Normal breath sounds.   Musculoskeletal:      Right lower le+ Pitting Edema present.      Left lower le+ Pitting Edema present.   Skin:     General: Skin is warm and dry.      Comments: Few scabbed lesions on bilateral arms.  No obvious bites.     Neurological:      Mental Status: She is alert and oriented to person, place, and time.   Psychiatric:         Mood and Affect: Mood normal.         Behavior: Behavior normal.                Assessment & Plan     1. Pedal edema  - recommend bilateral knee high compression socks with mild compression.  Apply in the morning and take off at bedtime.  Elevate legs when sitting.  She is almost done with her craft show.    - limit sodium intake.   - discuss with Cardiology at upcoming appt if no improvement with the above changes.   - PODIATRY/FOOT & ANKLE SURGERY REFERRAL  - order for DME; Equipment being ordered:  bilateral knee high compression socks  Dispense: 1 Device; Refill: 0    2. Itch of skin  - recommend applying mild non-scented lotion to skin after shower.  May continue Calamine lotion or try Hydrocortisone cream prn.    - cetirizine (ZYRTEC) 10 MG tablet; Take 1 tablet (10 mg) by mouth daily  Dispense: 90 tablet; Refill: 1  - ALLERGY/ASTHMA ADULT REFERRAL         Return in about 26 days (around 12/16/2019) for Keep appt on 12/16/19.    Kadi Leiva NP  [facility]

## 2019-11-21 NOTE — TELEPHONE ENCOUNTER
Called patient and gave providers message/ instructions.  Patient states (s)he understands this.     Alyse GARCIAN, RN

## 2019-11-21 NOTE — TELEPHONE ENCOUNTER
Patient states she has questions about the changes you made to taking her medications when she saw you yesterday.  Please call.    Thank you.

## 2019-11-21 NOTE — TELEPHONE ENCOUNTER
Returned call to patient.     She noticed on her AVS that was given to her after for yesterdays appointment, that the allopurinol was changed. She wants to know what it was changed to.  She has always taken 2 tablets daily but no new Rx was ordered.          Routing to provider to advise.  Alyse Hare BSN, RN

## 2019-11-21 NOTE — TELEPHONE ENCOUNTER
Yesterday she had 2 different orders for Allopurinol on the MAR (one read 100 mg daily and the other read 200 mg daily).  I just discontinued the Allopurinol 100 mg daily order.   She should be taking Allopurinol 200 mg daily.      Kadi Leiva, NP

## 2019-12-27 NOTE — TELEPHONE ENCOUNTER
Verbal orders for 7 PT visits.  Skilled nursing, eval for nutrition and wound care.  OT for evaluation.   eval to provide community resources.  Caller informed calls received after 3 pm may not be returned until following day.

## 2019-12-31 NOTE — TELEPHONE ENCOUNTER
Reason for Call:  Home Health Care    raudel with [facility] called regarding (reason for call): fyi: states family is taking patient in to [facility] with HR: 37 BP: 90/28. Thank you.    Orders are needed for this patient.     PT:     OT:     Skilled Nursing:     Pt Provider: Drew    Phone Number Homecare Nurse can be reached at: [phone]    Can we leave a detailed message on this number?     Phone number patient can be reached at:     Best Time:     Call taken on 12/31/2019 at 1:39 PM by Desiree Dooley

## 2020-01-01 ENCOUNTER — TELEPHONE (OUTPATIENT)
Dept: FAMILY MEDICINE | Facility: CLINIC | Age: 85
End: 2020-01-01

## 2020-01-01 ENCOUNTER — ANTICOAGULATION THERAPY VISIT (OUTPATIENT)
Dept: FAMILY MEDICINE | Facility: CLINIC | Age: 85
End: 2020-01-01
Payer: COMMERCIAL

## 2020-01-01 ENCOUNTER — MEDICAL CORRESPONDENCE (OUTPATIENT)
Dept: HEALTH INFORMATION MANAGEMENT | Facility: CLINIC | Age: 85
End: 2020-01-01

## 2020-01-01 ENCOUNTER — ANTICOAGULATION THERAPY VISIT (OUTPATIENT)
Dept: NURSING | Facility: CLINIC | Age: 85
End: 2020-01-01
Payer: COMMERCIAL

## 2020-01-01 ENCOUNTER — DOCUMENTATION ONLY (OUTPATIENT)
Dept: LAB | Facility: CLINIC | Age: 85
End: 2020-01-01

## 2020-01-01 ENCOUNTER — TRANSFERRED RECORDS (OUTPATIENT)
Dept: HEALTH INFORMATION MANAGEMENT | Facility: CLINIC | Age: 85
End: 2020-01-01

## 2020-01-01 ENCOUNTER — OFFICE VISIT (OUTPATIENT)
Dept: FAMILY MEDICINE | Facility: CLINIC | Age: 85
End: 2020-01-01
Payer: COMMERCIAL

## 2020-01-01 ENCOUNTER — HEALTH MAINTENANCE LETTER (OUTPATIENT)
Age: 85
End: 2020-01-01

## 2020-01-01 ENCOUNTER — PATIENT OUTREACH (OUTPATIENT)
Dept: CARE COORDINATION | Facility: CLINIC | Age: 85
End: 2020-01-01

## 2020-01-01 ENCOUNTER — ANTICOAGULATION THERAPY VISIT (OUTPATIENT)
Dept: NURSING | Facility: CLINIC | Age: 85
End: 2020-01-01

## 2020-01-01 VITALS
SYSTOLIC BLOOD PRESSURE: 122 MMHG | DIASTOLIC BLOOD PRESSURE: 72 MMHG | BODY MASS INDEX: 23.83 KG/M2 | TEMPERATURE: 97.9 F | WEIGHT: 118 LBS | HEART RATE: 90 BPM

## 2020-01-01 VITALS
TEMPERATURE: 98.2 F | SYSTOLIC BLOOD PRESSURE: 113 MMHG | WEIGHT: 128 LBS | DIASTOLIC BLOOD PRESSURE: 63 MMHG | HEART RATE: 84 BPM | BODY MASS INDEX: 25.85 KG/M2

## 2020-01-01 VITALS
BODY MASS INDEX: 25.25 KG/M2 | DIASTOLIC BLOOD PRESSURE: 56 MMHG | WEIGHT: 125 LBS | TEMPERATURE: 97.8 F | SYSTOLIC BLOOD PRESSURE: 96 MMHG | HEART RATE: 88 BPM

## 2020-01-01 VITALS
HEIGHT: 59 IN | RESPIRATION RATE: 16 BRPM | DIASTOLIC BLOOD PRESSURE: 66 MMHG | TEMPERATURE: 98.3 F | SYSTOLIC BLOOD PRESSURE: 111 MMHG | OXYGEN SATURATION: 100 % | BODY MASS INDEX: 22.38 KG/M2 | WEIGHT: 111 LBS | HEART RATE: 82 BPM

## 2020-01-01 DIAGNOSIS — Z79.01 CHRONIC ANTICOAGULATION: ICD-10-CM

## 2020-01-01 DIAGNOSIS — I48.20 CHRONIC ATRIAL FIBRILLATION (H): ICD-10-CM

## 2020-01-01 DIAGNOSIS — Z79.01 CHRONIC ANTICOAGULATION: Primary | ICD-10-CM

## 2020-01-01 DIAGNOSIS — S22.42XD CLOSED FRACTURE OF MULTIPLE RIBS OF LEFT SIDE WITH ROUTINE HEALING, SUBSEQUENT ENCOUNTER: ICD-10-CM

## 2020-01-01 DIAGNOSIS — D64.9 ANEMIA, UNSPECIFIED TYPE: ICD-10-CM

## 2020-01-01 DIAGNOSIS — I48.0 PAROXYSMAL ATRIAL FIBRILLATION (H): ICD-10-CM

## 2020-01-01 DIAGNOSIS — I48.0 PAROXYSMAL ATRIAL FIBRILLATION (H): Primary | ICD-10-CM

## 2020-01-01 DIAGNOSIS — Z53.9 DIAGNOSIS NOT YET DEFINED: Primary | ICD-10-CM

## 2020-01-01 DIAGNOSIS — M10.9 GOUT, UNSPECIFIED CAUSE, UNSPECIFIED CHRONICITY, UNSPECIFIED SITE: ICD-10-CM

## 2020-01-01 DIAGNOSIS — I50.9 OTHER CONGESTIVE HEART FAILURE (H): Primary | ICD-10-CM

## 2020-01-01 DIAGNOSIS — R60.0 PERIPHERAL EDEMA: ICD-10-CM

## 2020-01-01 DIAGNOSIS — I50.21 ACUTE SYSTOLIC CONGESTIVE HEART FAILURE (H): Primary | ICD-10-CM

## 2020-01-01 DIAGNOSIS — I50.9 CONGESTIVE HEART FAILURE, UNSPECIFIED HF CHRONICITY, UNSPECIFIED HEART FAILURE TYPE (H): ICD-10-CM

## 2020-01-01 DIAGNOSIS — R60.0 PERIPHERAL EDEMA: Primary | ICD-10-CM

## 2020-01-01 DIAGNOSIS — I48.20 CHRONIC ATRIAL FIBRILLATION (H): Primary | ICD-10-CM

## 2020-01-01 DIAGNOSIS — I47.29 PAROXYSMAL VENTRICULAR TACHYCARDIA (H): Primary | ICD-10-CM

## 2020-01-01 DIAGNOSIS — I50.9 OTHER CONGESTIVE HEART FAILURE (H): ICD-10-CM

## 2020-01-01 DIAGNOSIS — D53.9 MACROCYTIC ANEMIA: ICD-10-CM

## 2020-01-01 DIAGNOSIS — I10 HYPERTENSION GOAL BP (BLOOD PRESSURE) < 140/90: Primary | ICD-10-CM

## 2020-01-01 DIAGNOSIS — I10 HYPERTENSION GOAL BP (BLOOD PRESSURE) < 140/90: ICD-10-CM

## 2020-01-01 DIAGNOSIS — I47.29 PAROXYSMAL VENTRICULAR TACHYCARDIA (H): ICD-10-CM

## 2020-01-01 DIAGNOSIS — K21.00 GASTROESOPHAGEAL REFLUX DISEASE WITH ESOPHAGITIS: ICD-10-CM

## 2020-01-01 DIAGNOSIS — Z09 HOSPITAL DISCHARGE FOLLOW-UP: Primary | ICD-10-CM

## 2020-01-01 DIAGNOSIS — D50.9 IRON DEFICIENCY ANEMIA, UNSPECIFIED IRON DEFICIENCY ANEMIA TYPE: Primary | ICD-10-CM

## 2020-01-01 DIAGNOSIS — Z01.818 PREOP GENERAL PHYSICAL EXAM: Primary | ICD-10-CM

## 2020-01-01 LAB
ALBUMIN SERPL-MCNC: 2.4 G/DL (ref 3.4–5)
ALBUMIN SERPL-MCNC: 2.4 G/DL (ref 3.4–5)
ALP SERPL-CCNC: 222 U/L (ref 40–150)
ALT SERPL W P-5'-P-CCNC: 20 U/L (ref 0–50)
ANION GAP SERPL CALCULATED.3IONS-SCNC: 6 MMOL/L (ref 3–14)
ANION GAP SERPL CALCULATED.3IONS-SCNC: 7 MMOL/L (ref 3–14)
AST SERPL W P-5'-P-CCNC: 54 U/L (ref 0–45)
BILIRUB SERPL-MCNC: 0.7 MG/DL (ref 0.2–1.3)
BUN SERPL-MCNC: 35 MG/DL (ref 7–30)
BUN SERPL-MCNC: 40 MG/DL (ref 7–30)
CALCIUM SERPL-MCNC: 8.7 MG/DL (ref 8.5–10.1)
CALCIUM SERPL-MCNC: 8.7 MG/DL (ref 8.5–10.1)
CAPILLARY BLOOD COLLECTION: NORMAL
CAPILLARY BLOOD COLLECTION: NORMAL
CHLORIDE SERPL-SCNC: 103 MMOL/L (ref 94–109)
CHLORIDE SERPL-SCNC: 97 MMOL/L (ref 94–109)
CO2 SERPL-SCNC: 27 MMOL/L (ref 20–32)
CO2 SERPL-SCNC: 30 MMOL/L (ref 20–32)
CREAT SERPL-MCNC: 1.8 MG/DL (ref 0.52–1.04)
CREAT SERPL-MCNC: 1.82 MG/DL (ref 0.57–1.11)
CREAT SERPL-MCNC: 2.03 MG/DL (ref 0.52–1.04)
EJECTION FRACTION: 60 %
EJECTION FRACTION: NORMAL %
ERYTHROCYTE [DISTWIDTH] IN BLOOD BY AUTOMATED COUNT: 16.7 % (ref 10–15)
FERRITIN SERPL-MCNC: 48 NG/ML (ref 8–252)
GFR SERPL CREATININE-BSD FRML MDRD: 22 ML/MIN/{1.73_M2}
GFR SERPL CREATININE-BSD FRML MDRD: 26 ML/MIN/{1.73_M2}
GFR SERPL CREATININE-BSD FRML MDRD: 27 ML/MIN/1.73M2
GLUCOSE SERPL-MCNC: 105 MG/DL (ref 70–99)
GLUCOSE SERPL-MCNC: 110 MG/DL (ref 65–100)
GLUCOSE SERPL-MCNC: 122 MG/DL (ref 70–99)
HCT VFR BLD AUTO: 30.9 % (ref 35–47)
HGB BLD-MCNC: 9.1 G/DL (ref 11.7–15.7)
INR POINT OF CARE: 1.5 (ref 0.86–1.14)
INR POINT OF CARE: 1.8 (ref 0.86–1.14)
INR POINT OF CARE: 2.2 (ref 0.86–1.14)
INR PPP: 1.2 (ref 0.9–1.1)
INR PPP: 1.3 (ref 0.9–1.1)
INR PPP: 1.6 (ref 0.9–1.1)
INR PPP: 1.8 (ref 0.9–1.1)
INR PPP: 1.9 (ref 0.9–1.1)
INR PPP: 2 (ref 0.86–1.14)
INR PPP: 2 (ref 0.9–1.1)
INR PPP: 2.1 (ref 0.9–1.1)
INR PPP: 2.1 (ref 0.9–1.1)
INR PPP: 2.2 (ref 0.9–1.1)
INR PPP: 2.4 (ref 0.9–1.1)
INR PPP: 2.5 (ref 0.86–1.14)
INR PPP: 2.5 (ref 0.9–1.1)
INR PPP: 2.6 (ref 0.9–1.1)
INR PPP: 2.7 (ref 0.9–1.1)
INR PPP: 3.1 (ref 0.9–1.1)
INR PPP: 3.2 (ref 0.9–1.1)
INR PPP: 3.8 (ref 0.9–1.1)
MCH RBC QN AUTO: 29.5 PG (ref 26.5–33)
MCHC RBC AUTO-ENTMCNC: 29.4 G/DL (ref 31.5–36.5)
MCV RBC AUTO: 100 FL (ref 78–100)
PHOSPHATE SERPL-MCNC: 3 MG/DL (ref 2.5–4.5)
PLATELET # BLD AUTO: 300 10E9/L (ref 150–450)
POTASSIUM SERPL-SCNC: 3.7 MMOL/L (ref 3.4–5.3)
POTASSIUM SERPL-SCNC: 4.9 MMOL/L (ref 3.4–5.3)
POTASSIUM SERPL-SCNC: 5.3 MMOL/L (ref 3.5–5)
PROT SERPL-MCNC: 7.3 G/DL (ref 6.8–8.8)
RBC # BLD AUTO: 3.08 10E12/L (ref 3.8–5.2)
SODIUM SERPL-SCNC: 134 MMOL/L (ref 133–144)
SODIUM SERPL-SCNC: 136 MMOL/L (ref 133–144)
T4 FREE SERPL-MCNC: 1.59 NG/DL (ref 0.76–1.46)
TSH SERPL DL<=0.005 MIU/L-ACNC: 6.03 MU/L (ref 0.4–4)
WBC # BLD AUTO: 9.5 10E9/L (ref 4–11)

## 2020-01-01 PROCEDURE — 99207 ZZC NO CHARGE NURSE ONLY: CPT | Performed by: FAMILY MEDICINE

## 2020-01-01 PROCEDURE — 80069 RENAL FUNCTION PANEL: CPT | Performed by: FAMILY MEDICINE

## 2020-01-01 PROCEDURE — 99214 OFFICE O/P EST MOD 30 MIN: CPT | Performed by: FAMILY MEDICINE

## 2020-01-01 PROCEDURE — 36416 COLLJ CAPILLARY BLOOD SPEC: CPT | Performed by: FAMILY MEDICINE

## 2020-01-01 PROCEDURE — 84443 ASSAY THYROID STIM HORMONE: CPT | Performed by: FAMILY MEDICINE

## 2020-01-01 PROCEDURE — G0179 MD RECERTIFICATION HHA PT: HCPCS | Performed by: FAMILY MEDICINE

## 2020-01-01 PROCEDURE — 36416 COLLJ CAPILLARY BLOOD SPEC: CPT

## 2020-01-01 PROCEDURE — 82728 ASSAY OF FERRITIN: CPT | Performed by: FAMILY MEDICINE

## 2020-01-01 PROCEDURE — 36415 COLL VENOUS BLD VENIPUNCTURE: CPT | Performed by: FAMILY MEDICINE

## 2020-01-01 PROCEDURE — G0180 MD CERTIFICATION HHA PATIENT: HCPCS | Performed by: FAMILY MEDICINE

## 2020-01-01 PROCEDURE — 99495 TRANSJ CARE MGMT MOD F2F 14D: CPT | Performed by: FAMILY MEDICINE

## 2020-01-01 PROCEDURE — 99207 ZZC NO CHARGE NURSE ONLY: CPT

## 2020-01-01 PROCEDURE — 85610 PROTHROMBIN TIME: CPT | Performed by: FAMILY MEDICINE

## 2020-01-01 PROCEDURE — 80053 COMPREHEN METABOLIC PANEL: CPT | Performed by: FAMILY MEDICINE

## 2020-01-01 PROCEDURE — 84439 ASSAY OF FREE THYROXINE: CPT | Performed by: FAMILY MEDICINE

## 2020-01-01 PROCEDURE — 99215 OFFICE O/P EST HI 40 MIN: CPT | Performed by: FAMILY MEDICINE

## 2020-01-01 PROCEDURE — 85610 PROTHROMBIN TIME: CPT | Mod: QW

## 2020-01-01 PROCEDURE — 85027 COMPLETE CBC AUTOMATED: CPT | Performed by: FAMILY MEDICINE

## 2020-01-01 RX ORDER — POTASSIUM CHLORIDE 1.5 G/1.58G
20 POWDER, FOR SOLUTION ORAL DAILY
COMMUNITY
Start: 2020-01-01 | End: 2020-01-01

## 2020-01-01 RX ORDER — WARFARIN SODIUM 1 MG/1
TABLET ORAL
Qty: 60 TABLET | Refills: 0 | Status: SHIPPED | OUTPATIENT
Start: 2020-01-01 | End: 2020-01-01

## 2020-01-01 RX ORDER — BUMETANIDE 1 MG/1
1 TABLET ORAL EVERY MORNING
COMMUNITY
Start: 2020-01-01 | End: 2020-01-01

## 2020-01-01 RX ORDER — BUMETANIDE 1 MG/1
1 TABLET ORAL
Qty: 30 TABLET | Refills: 0 | COMMUNITY
Start: 2020-01-01 | End: 2020-01-01

## 2020-01-01 RX ORDER — AMIODARONE HYDROCHLORIDE 200 MG/1
TABLET ORAL
COMMUNITY
Start: 2020-01-01 | End: 2020-01-01

## 2020-01-01 RX ORDER — LOSARTAN POTASSIUM 25 MG/1
12.5 TABLET ORAL DAILY
Qty: 45 TABLET | Refills: 1 | Status: SHIPPED | OUTPATIENT
Start: 2020-01-01 | End: 2020-01-01

## 2020-01-01 RX ORDER — POLYETHYLENE GLYCOL 3350 17 G/17G
1 POWDER, FOR SOLUTION ORAL DAILY
COMMUNITY
Start: 2020-01-01 | End: 2020-01-01

## 2020-01-01 RX ORDER — WARFARIN SODIUM 2.5 MG/1
TABLET ORAL DAILY
COMMUNITY
Start: 2020-01-01 | End: 2020-01-01 | Stop reason: DRUGHIGH

## 2020-01-01 RX ORDER — BUMETANIDE 1 MG/1
1 TABLET ORAL EVERY MORNING
Qty: 30 TABLET | Refills: 1 | Status: SHIPPED | OUTPATIENT
Start: 2020-01-01 | End: 2020-01-01

## 2020-01-01 RX ORDER — ALLOPURINOL 100 MG/1
200 TABLET ORAL DAILY
Qty: 180 TABLET | Refills: 1 | Status: SHIPPED | OUTPATIENT
Start: 2020-01-01

## 2020-01-01 RX ORDER — POTASSIUM CHLORIDE 1.5 G/1.58G
20 POWDER, FOR SOLUTION ORAL DAILY
Qty: 90 TABLET | Refills: 1 | Status: SHIPPED | OUTPATIENT
Start: 2020-01-01 | End: 2020-01-01

## 2020-01-01 RX ORDER — LOSARTAN POTASSIUM 25 MG/1
12.5 TABLET ORAL
COMMUNITY
Start: 2020-01-01 | End: 2020-01-01

## 2020-01-01 RX ORDER — WARFARIN SODIUM 1 MG/1
1 TABLET ORAL DAILY
Status: CANCELLED | OUTPATIENT
Start: 2020-01-01

## 2020-01-01 RX ORDER — WARFARIN SODIUM 1 MG/1
TABLET ORAL
Qty: 60 TABLET | Refills: 1 | Status: SHIPPED | OUTPATIENT
Start: 2020-01-01 | End: 2020-01-01

## 2020-01-01 RX ORDER — SENNA AND DOCUSATE SODIUM 50; 8.6 MG/1; MG/1
1 TABLET, FILM COATED ORAL 2 TIMES DAILY
Qty: 2 TABLET | COMMUNITY
Start: 2020-01-01 | End: 2020-01-01

## 2020-01-01 RX ORDER — METHOCARBAMOL 500 MG/1
TABLET, FILM COATED ORAL
COMMUNITY
Start: 2020-01-01 | End: 2020-01-01

## 2020-01-01 RX ORDER — ACETAMINOPHEN 500 MG
1000 TABLET ORAL EVERY 6 HOURS PRN
COMMUNITY
Start: 2020-01-01

## 2020-01-01 RX ORDER — WARFARIN SODIUM 1 MG/1
TABLET ORAL
Qty: 192 TABLET | Refills: 0 | Status: SHIPPED | OUTPATIENT
Start: 2020-01-01 | End: 2020-01-01

## 2020-01-01 RX ORDER — AMIODARONE HYDROCHLORIDE 100 MG/1
100 TABLET ORAL DAILY
COMMUNITY
Start: 2020-01-01

## 2020-01-01 RX ORDER — BUMETANIDE 0.5 MG/1
0.5 TABLET ORAL DAILY
Qty: 60 TABLET | Refills: 5 | Status: SHIPPED | OUTPATIENT
Start: 2020-01-01

## 2020-01-01 RX ORDER — AMIODARONE HYDROCHLORIDE 200 MG/1
200 TABLET ORAL DAILY
COMMUNITY
Start: 2020-01-01 | End: 2020-01-01

## 2020-01-01 RX ORDER — CLOPIDOGREL BISULFATE 75 MG/1
75 TABLET ORAL DAILY
COMMUNITY
Start: 2020-01-01

## 2020-01-01 RX ORDER — METOPROLOL SUCCINATE 25 MG/1
25 TABLET, EXTENDED RELEASE ORAL
COMMUNITY
Start: 2020-01-01 | End: 2020-01-01

## 2020-01-01 RX ORDER — WARFARIN SODIUM 1 MG/1
TABLET ORAL
Qty: 192 TABLET | Refills: 0
Start: 2020-01-01 | End: 2020-01-01 | Stop reason: ALTCHOICE

## 2020-01-01 RX ORDER — FERROUS SULFATE 325(65) MG
325 TABLET, DELAYED RELEASE (ENTERIC COATED) ORAL DAILY
Qty: 90 TABLET | Refills: 1 | Status: SHIPPED | OUTPATIENT
Start: 2020-01-01

## 2020-01-01 RX ORDER — WARFARIN SODIUM 1 MG/1
TABLET ORAL
Qty: 192 TABLET | Refills: 0
Start: 2020-01-01 | End: 2020-01-01

## 2020-01-01 RX ORDER — PANTOPRAZOLE SODIUM 20 MG/1
20 TABLET, DELAYED RELEASE ORAL DAILY
Qty: 30 TABLET | Refills: 1 | Status: SHIPPED | OUTPATIENT
Start: 2020-01-01 | End: 2020-01-01

## 2020-01-01 RX ORDER — METOPROLOL SUCCINATE 25 MG/1
25 TABLET, EXTENDED RELEASE ORAL DAILY
Qty: 90 TABLET | Refills: 1 | Status: SHIPPED | OUTPATIENT
Start: 2020-01-01 | End: 2020-01-01

## 2020-01-01 ASSESSMENT — PAIN SCALES - GENERAL: PAINLEVEL: NO PAIN (0)

## 2020-01-01 ASSESSMENT — ANXIETY QUESTIONNAIRES
GAD7 TOTAL SCORE: 0
6. BECOMING EASILY ANNOYED OR IRRITABLE: NOT AT ALL
7. FEELING AFRAID AS IF SOMETHING AWFUL MIGHT HAPPEN: NOT AT ALL
GAD7 TOTAL SCORE: 0
IF YOU CHECKED OFF ANY PROBLEMS ON THIS QUESTIONNAIRE, HOW DIFFICULT HAVE THESE PROBLEMS MADE IT FOR YOU TO DO YOUR WORK, TAKE CARE OF THINGS AT HOME, OR GET ALONG WITH OTHER PEOPLE: NOT DIFFICULT AT ALL
1. FEELING NERVOUS, ANXIOUS, OR ON EDGE: NOT AT ALL
5. BEING SO RESTLESS THAT IT IS HARD TO SIT STILL: NOT AT ALL
3. WORRYING TOO MUCH ABOUT DIFFERENT THINGS: NOT AT ALL
2. NOT BEING ABLE TO STOP OR CONTROL WORRYING: NOT AT ALL

## 2020-01-01 ASSESSMENT — PATIENT HEALTH QUESTIONNAIRE - PHQ9
SUM OF ALL RESPONSES TO PHQ QUESTIONS 1-9: 4
5. POOR APPETITE OR OVEREATING: NOT AT ALL

## 2020-01-01 ASSESSMENT — MIFFLIN-ST. JEOR: SCORE: 869.12

## 2020-01-07 NOTE — TELEPHONE ENCOUNTER
Reason for Call:  Form, our goal is to have forms completed with 72 hours, however, some forms may require a visit or additional information.    Type of letter, form or note:  Home Health Certification    Who is the form from?: Home care    Where did the form come from: form was faxed in    What clinic location was the form placed at?: [place]    Where the form was placed: Given to MA/RN    What number is listed as a contact on the form?: [phone]       Additional comments:     Call taken on 1/7/2020 at 7:30 AM by Batsheva Roth

## 2020-01-09 NOTE — TELEPHONE ENCOUNTER
Pt did not come to appointment today.  I called home care to see when they would be going out to see her next and they said last they heard she was in hospital and they had not been notified she had been released.  They will look into this and call me back.  Savi Elder BSN, RN

## 2020-01-09 NOTE — TELEPHONE ENCOUNTER
Elin from home care said pt was in hospital from 12/8 to 12-24 and then they saw her and then she went back to the hospital on 12/31/19.  Savi GARCIAN, RN

## 2020-01-16 NOTE — TELEPHONE ENCOUNTER
See 1- gerontology visit for transitional care visit ( admit) from hospital.    Medications reconciled.  Savi GARCIAN, RN

## 2020-01-20 NOTE — TELEPHONE ENCOUNTER
Ok for INR RN referall. I have no idea what current warfarin dosage is so ok for one month supply of current dosage. Patient should follow-up with myself in next month for med check/etc. Naga Russell MD

## 2020-01-20 NOTE — TELEPHONE ENCOUNTER
Warfarin ordered using  1 mg tablets at current dose and sent to pharmacy. Chart reviewed. Spoke with home care nurse Janey and she was given warfarin dose instructions and next INR will be done by home care on 1/23/20. See anticoagulation encounter. Jen Leon RN

## 2020-01-20 NOTE — PROGRESS NOTES
ANTICOAGULATION FOLLOW-UP CLINIC VISIT    Patient Name:  Nan Doran  Date:  2020  Contact Type:  Telephone    SUBJECTIVE:  Patient Findings     Comments:   New to home care after discharge from TCU yesterday. Nurse calling for warfarin dose and next INR. Also needs refill of warfarin as she was sent home with none. Chart reviewed and dose instructions given to Janey. rx for 1 mg tablets sent to pharmacy. Note patient was transitioned from apixaban at recent hospitalization and plan is for possible Watchman procedure in future. Order in hospital is for goal of 1.5-2.5 due to risk of bleeding. New referral signed by provider with goal of 1.5-2.0.        Clinical Outcomes     Comments:   New to home care after discharge from TCU yesterday. Nurse calling for warfarin dose and next INR. Also needs refill of warfarin as she was sent home with none. Chart reviewed and dose instructions given to Janey. rx for 1 mg tablets sent to pharmacy. Note patient was transitioned from apixaban at recent hospitalization and plan is for possible Watchman procedure in future. Order in hospital is for goal of 1.5-2.5 due to risk of bleeding. New referral signed by provider with goal of 1.5-2.0.           OBJECTIVE    INR   Date Value Ref Range Status   2020 1.9 (A) 0.90 - 1.10 Final       ASSESSMENT / PLAN  INR assessment THER    Recheck INR In: 3 DAYS    INR Location Homecare INR      Anticoagulation Summary  As of 2020    INR goal:   1.5-2.0   TTR:   --   INR used for dosin.9 (2020)   Warfarin maintenance plan:   No maintenance plan   Full warfarin instructions:   : 1 mg; : 2 mg; : 1 mg   Plan last modified:   Jen Leon RN (2020)   Next INR check:   2020   Target end date:   Indefinite    Indications    Chronic anticoagulation [Z79.01]             Anticoagulation Episode Summary     INR check location:       Preferred lab:       Send INR reminders to:   CON PATINO     Comments:   Goal 1.5-2.0 is out of protocol. If INR out of this range , consult with pharmacist or provider to advise.       Anticoagulation Care Providers     Provider Role Specialty Phone number    Naga Russell MD [facility] [phone]            See the Encounter Report to view Anticoagulation Flowsheet and Dosing Calendar (Go to Encounters tab in chart review, and find the Anticoagulation Therapy Visit)        Jen Leon RN

## 2020-01-20 NOTE — LETTER
January 20, 2020    Nan Doran  [address] [facility] [address]    Dear Nan,       We recently received a refill request for warfarin.  We have refilled this for one time only because you are due for a:    Medication check office visit-in the next month please.      Please call at your earliest convenience so that there will not be a delay with your future refills.          Thank you,   Your [facility] Team/  [phone]

## 2020-01-20 NOTE — TELEPHONE ENCOUNTER
Patient was discharged from [facility] TCU. According to patient and chart review, INR goal range is 1.5-2.5.     Patient needs an INR clinic referral for [facility] to manage warfarin dosing. The current goal range for patient is not within our protocols. If you wish to keep this goal range the same, every INR that is out of range will need to be reviewed by patient's primary provider or [facility] pharmacist.     According to chart review:  [facility] 12/31-1/10- PRINCIPAL DISCHARGE DIAGNOSIS: bradycardia requiring pacemaker placement  FOBT was positive for blood. upper endoscopy which was negative for any obvious signs of bleeding. Cardiology will discuss potential watchman device placement as an outpatient. Apixaban was stopped indefinitely and replaced with Coumadin with an INR range of 1.8-2.5 to reduce the risk of further GI bleeding.      Patient had her INR checked today by [facility] nurseJaney. INR is 1.9. [facility] is unable to manage dosing until the referral is signed.     Additionally patient needs a warfarin refill today (she is completely out of her medication).      Collin ORDONEZ RN, CACP 1/20/2020 at 2:19 PM

## 2020-01-23 NOTE — PROGRESS NOTES
ANTICOAGULATION FOLLOW-UP CLINIC VISIT    Patient Name:  Nan Doran  Date:  2020  Contact Type:  Telephone    SUBJECTIVE:  Patient Findings     Comments:   INR 1.9 reported by home care nurse with no changes or concerns. Verbal orders given to nurse [place].        Clinical Outcomes     Negatives:   Major bleeding event, Thromboembolic event, Anticoagulation-related hospital admission, Anticoagulation-related ED visit, Anticoagulation-related fatality    Comments:   INR 1.9 reported by home care nurse with no changes or concerns. Verbal orders given to nurse [place].           OBJECTIVE    INR   Date Value Ref Range Status   2020 1.9 (A) 0.90 - 1.10 Final       ASSESSMENT / PLAN  INR assessment THER    Recheck INR In: 4 DAYS    INR Location [facility] INR      Anticoagulation Summary  As of 2020    INR goal:   1.5-2.0   TTR:   --   INR used for dosin.9 (2020)   Warfarin maintenance plan:   2 mg (1 mg x 2) every Tue, Thu, Sat; 1 mg (1 mg x 1) all other days   Full warfarin instructions:   2 mg every Tue, Thu, Sat; 1 mg all other days   Weekly warfarin total:   10 mg   Plan last modified:   Jen Leon RN (2020)   Next INR check:   2020   Priority:   High   Target end date:   Indefinite    Indications    Chronic anticoagulation [Z79.01]             Anticoagulation Episode Summary     INR check location:       Preferred lab:       Send INR reminders to:   CON PATINO    Comments:   Goal 1.5-2.0 is out of protocol. If INR out of this range , consult with pharmacist or provider to advise.       Anticoagulation Care Providers     Provider Role Specialty Phone number    Naga Russell MD [facility] [phone]            See the Encounter Report to view Anticoagulation Flowsheet and Dosing Calendar (Go to Encounters tab in chart review, and find the Anticoagulation Therapy Visit)        Jen Leon RN

## 2020-01-27 NOTE — TELEPHONE ENCOUNTER
Caller informed that calls received after 3pm may not be returned same day    Reason for Call:  INR    Who is calling?  Home Care: [facility]    Phone number:  [phone]    Fax number:  na    Name of caller: Sofia    INR Value:  1.6    Are there any other concerns:  No    Can we leave a detailed message on this number? YES    Phone number patient can be reached at: Home number on file [phone] (home)      Call taken on 1/27/2020 at 3:10 PM by Karen Sanchez

## 2020-01-27 NOTE — PROGRESS NOTES
ANTICOAGULATION FOLLOW-UP CLINIC VISIT    Patient Name:  Nan Doran  Date:  2020  Contact Type:  Telephone    SUBJECTIVE:  Patient Findings     Comments:   INR 1.6 reported by home care nurse. Therapeutic range is 1.5 - 2.0.. no changes or new concerns reported. Patient has provider visit tomorrow. Home care nurse will see patient again on Thursday. Verbal order give to Sofia for dose and recheck on Thursday.         Clinical Outcomes     Comments:   INR 1.6 reported by home care nurse. Therapeutic range is 1.5 - 2.0.. no changes or new concerns reported. Patient has provider visit tomorrow. Home care nurse will see patient again on Thursday. Verbal order give to Sofia for dose and recheck on Thursday.            OBJECTIVE    INR   Date Value Ref Range Status   2020 1.6 (A) 0.90 - 1.10 Final       ASSESSMENT / PLAN  INR assessment THER    Recheck INR In: 3 DAYS    INR Location [facility] INR      Anticoagulation Summary  As of 2020    INR goal:   1.5-2.0   TTR:   --   INR used for dosin.6 (2020)   Warfarin maintenance plan:   2 mg (1 mg x 2) every Tue, Thu, Sat; 1 mg (1 mg x 1) all other days   Full warfarin instructions:   2 mg every Tue, Thu, Sat; 1 mg all other days   Weekly warfarin total:   10 mg   Plan last modified:   Jen Leon RN (2020)   Next INR check:   2020   Priority:   High   Target end date:   Indefinite    Indications    Chronic anticoagulation [Z79.01]             Anticoagulation Episode Summary     INR check location:       Preferred lab:       Send INR reminders to:   CON PATINO    Comments:   Goal 1.5-2.0 is out of protocol. If INR out of this range , consult with pharmacist or provider to advise.       Anticoagulation Care Providers     Provider Role Specialty Phone number    Naga Russell MD [facility] [phone]            See the Encounter Report to view Anticoagulation Flowsheet and Dosing Calendar (Go to Encounters tab  in chart review, and find the Anticoagulation Therapy Visit)        Jen Leon RN

## 2020-01-28 NOTE — NURSING NOTE
"Chief Complaint   Patient presents with     Hospital F/U       Initial /66   Pulse 82   Temp 98.3  F (36.8  C) (Oral)   Resp 16   Ht 1.499 m (4' 11\")   Wt 50.3 kg (111 lb)   SpO2 100%   BMI 22.42 kg/m   Estimated body mass index is 22.42 kg/m  as calculated from the following:    Height as of this encounter: 1.499 m (4' 11\").    Weight as of this encounter: 50.3 kg (111 lb).    Savi Hamilton, LIZETTE    "

## 2020-01-28 NOTE — PROGRESS NOTES
Subjective     Nan Doran is a 84 year old female who presents to clinic today for the following health issues:    HPI   Follow-up hospitalization for bradycardia/hypotension and anemia. Had pacemaker placed and EGD. Added protonix. Also had left rib fracture too. ?on iron pill. No bloody stools noted. No abdominal pain. Appetite ok. No urine changes or hematuria.   Here with daughter. Lives with son and son's wife. Home health coming out. Has cell phone. Tylenol prn helpful.     Per discharge summary:  [facility] COURSE: Nan Doran is a 84 y.o. female with a complicated past medical history that includes cardiomyopathy, atrial fibrillation, stroke this past summer who presented emergency department with hypotension. Please refer to history and physical by Dr. Beckwith as well as consultation notes from cardiology and gastroenterology for complete details of admission. In summary, patient was found to be both bradycardic and hypotension upon arriving in the emergency department. Her digoxin level was found to be elevated at 3.6 and she was given a dose of Digibind as well as placed on isoproterenol. Cardiology was consulted and recommended holding any AV jacobo blocking medications. Patient was eventually weaned off of isoproterenol but given her low ejection fraction and intermittent episodes of tachycardia she was deemed a candidate for AV jacobo ablation and biventricular pacemaker placement. This was placed on 1/6. Given her history of atrial fibrillation and stroke it was recommended that she be placed back on anticoagulation however she was found to have significant anemia on admission. Her FOBT was positive for blood. At the start of her admission gastroenterology was consulted but patient declined any endoscopy. This was revisited with the patient and her family and she did consent to an upper endoscopy which was negative for any obvious signs of bleeding. Cardiology will discuss potential watchman  device placement as an outpatient. Apixaban was stopped indefinitely and replaced with Coumadin with an INR range of 1.8-2.5 to reduce the risk of further GI bleeding.    Hypotension-resolved  -due to severe bradycardia/hypovolemia  -possible bleed-Hb dropped with fluids to 7  -improved off digoxin,IVF/isopreternol off     Dig toxicity-resolved  -presented with toxic dig level, nausea/vomitning,bradycardia and confusion  -digibind give on arrival,dig levels coming down 1/4 from 3.8 peak to 2.1 1/4  -improved HR     Bradycardia-resolved  -due to dig,metoprolol,amiodarone  -holding all,was on isuprel,now off  - ppm  -replaced Mg     Recurrent VT ,short run,asymptomatic  -resume amio/BB after PPP  - ICD placed     KATHY/CKD-resolved  -due to hypotension/bradycardia  -at baseline     Anemia,acute on chronic  -Hb dropped to as low as 7,up to 9.1 on 1/3 after 1 unit of PRBC  -IFOBT+ 1/5  -added PO Protonix,no h/o melena/BRBPR  -holding apixaban for prep for PPP,c  -EGD negative   -stable Hb  -1 unit of blood given 1/1-age,comorbidities, another unit of blood given 1/8 for hemoglobin of 6.8  Patient is declining colonoscopy at this time    PROCEDURES PERFORMED DURING HOSPITALIZATION: Upper endoscopy without biopsy  Successful placement of a biventricular single chamber ICD and AV node ablation      Hospital Follow-up Visit:    Hospital/Nursing Home/IP Rehab Facility: [facility]   Date of Admission: 10-  Date of Discharge: 10 -  Reason(s) for Admission: pace maker, defibrillate, left side fractured ribs-fell in bathroom               Problems taking medications regularly:  None       Medication changes since discharge: None       Problems adhering to non-medication therapy:  None    Summary of hospitalization:  CareEverywhere information obtained and reviewed  Diagnostic Tests/Treatments reviewed.  Follow up needed: hemoglobin  Other Healthcare Providers Involved in Patient s Care:          Homecare  Update since discharge: stable.    Post Discharge Medication Reconciliation: discharge medications reconciled and changed, per note/orders (see AVS).  Plan of care communicated with patient and family     Coding guidelines for this visit:  Type of Medical   Decision Making Face-to-Face Visit       within 7 Days of discharge Face-to-Face Visit        within 14 days of discharge   Moderate Complexity [id number]   High Complexity [id number]            PROBLEMS TO ADD ON...    Patient Active Problem List   Diagnosis     Gout     Hypertension goal BP (blood pressure) < 140/90     Advanced directives, counseling/discussion     Hyperlipidemia LDL goal <130     CKD (chronic kidney disease) stage 3, GFR 30-59 ml/min (H)     Macrocytic anemia     Acute kidney injury superimposed on chronic kidney disease (H)     Chronic atrial fibrillation     CHF (congestive heart failure) (H)     Chronic anticoagulation     Closed fracture of left ankle     Diarrhea     Displaced fracture of medial malleolus of unspecified tibia, initial encounter for closed fracture     Tophaceous gout     Left-sided weakness     History of CVA (cerebrovascular accident)     Atrial fibrillation (H)     Past Surgical History:   Procedure Laterality Date     GYN SURGERY      tubal     HC DUPLEX SCAN EXTRACRANIAL, LIMITED UNILATERAL      normal carotid ultrasound 2006     HEAD & NECK SURGERY      tonsils       Social History     Tobacco Use     Smoking status: Former Smoker     Last attempt to quit: 1984     Years since quittin.0     Smokeless tobacco: Never Used   Substance Use Topics     Alcohol use: No     Family History   Problem Relation Age of Onset     Cerebrovascular Disease Father      Diabetes Paternal Grandmother      Blood Disease Daughter         lupus     Blood Disease Daughter         lupus           Reviewed and updated as needed this visit by Provider         Review of Systems   All other ROS negative.       Objective   "  There were no vitals taken for this visit.  There is no height or weight on file to calculate BMI.   /66   Pulse 82   Temp 98.3  F (36.8  C) (Oral)   Resp 16   Ht 1.499 m (4' 11\")   Wt 50.3 kg (111 lb)   SpO2 100%   BMI 22.42 kg/m      Physical Exam   GENERAL: healthy, alert and no distress  EYES: Eyes grossly normal to inspection, PERRL and conjunctivae and sclerae normal  HENT: ear canals and TM's normal, nose and mouth without ulcers or lesions  NECK: no adenopathy, no asymmetry, masses, or scars and thyroid normal to palpation  RESP: imild crackles left lung base. Good overall airflow.   CV: regular rate and rhythm, normal S1 S2, no S3 or S4, no murmur, click or rub, no peripheral edema and peripheral pulses strong  ABDOMEN: soft, nontender, no hepatosplenomegaly, no masses and bowel sounds normal  MS: mild tenderness left lower ribs.   SKIN: incision upper left chest clean  PSYCH: mentation appears normal, affect normal/bright    Diagnostic Test Results:  Labs reviewed in Epic        ASSESSMENT / PLAN:  (D50.9) Iron deficiency anemia, unspecified iron deficiency anemia type  (primary encounter diagnosis)  Comment: possible lower GI. Stable clinically  Plan: ferrous sulfate (FE TABS) 325 (65 Fe) MG EC         tablet, Hemoglobin, Ferritin        Start iron pill and return to clinic for lab in 3-4 weeks. Patient NOT interested at this point. Avoid nsaids and consider lowered INR? GI? Expected course and warning signs reviewed. Back to ER if worsening fatigue/ abdominal pain/etc. Call/email with questions/concerns    (M10.9) Gout, unspecified cause, unspecified chronicity, unspecified site  Comment: stable  Plan: allopurinol (ZYLOPRIM) 100 MG tablet            (I50.9) Congestive heart failure, unspecified HF chronicity, unspecified heart failure type (H)  Comment: stabld  Plan: per cardiololgy    (I48.20) Chronic atrial fibrillation  Comment: stable  Plan: per cardiology. Getting INR via home " care    (S22.42XD) Closed fracture of multiple ribs of left side with routine healing, subsequent encounter  Comment: slowly improving  Plan: continue tylenol prn/heat and take deep breaths. Return to clinic if worsening pain/cough/ fevers or chills/etc.     Naga Russell MD

## 2020-01-31 NOTE — TELEPHONE ENCOUNTER
Reason for Call:  INR    Who is calling?  Home Care    Phone number:  [phone]    Fax number:      Name of caller: Sofia    INR Value:  1.6    Are there any other concerns:  No    Can we leave a detailed message on this number? YES    Phone number patient can be reached at: Home number on file [phone] (home)      Call taken on 1/31/2020 at 9:33 AM by Eva Ewing

## 2020-01-31 NOTE — PROGRESS NOTES
ANTICOAGULATION FOLLOW-UP CLINIC VISIT    Patient Name:  Nan Doran  Date:  2020  Contact Type:  Face to Face    SUBJECTIVE:  Patient Findings     Comments:   INR per home care nurse is 1.6. Therapeutic Range is 1.5- 2.5. dose instructions given to nurse Sofia. Recheck in 3 days.         Clinical Outcomes     Negatives:   Major bleeding event, Thromboembolic event, Anticoagulation-related hospital admission, Anticoagulation-related ED visit, Anticoagulation-related fatality    Comments:   INR per home care nurse is 1.6. Therapeutic Range is 1.5- 2.5. dose instructions given to nurse Sofia. Recheck in 3 days.            OBJECTIVE    INR   Date Value Ref Range Status   2020 1.6 (A) 0.90 - 1.10 Final       ASSESSMENT / PLAN  INR assessment THER    Recheck INR In: 3 DAYS    INR Location Home[facility] INR      Anticoagulation Summary  As of 2020    INR goal:   1.5-2.0   TTR:   100.0 % (1 d)   INR used for dosin.6 (2020)   Warfarin maintenance plan:   2 mg (1 mg x 2) every Tue, Thu, Sat; 1 mg (1 mg x 1) all other days   Full warfarin instructions:   2 mg every Tue, Thu, Sat; 1 mg all other days   Weekly warfarin total:   10 mg   No change documented:   Jen Leon RN   Plan last modified:   Jen Leon RN (2020)   Next INR check:   2/3/2020   Priority:   High   Target end date:   Indefinite    Indications    Chronic anticoagulation [Z79.01]  Chronic atrial fibrillation [I48.20]             Anticoagulation Episode Summary     INR check location:       Preferred lab:       Send INR reminders to:   CON PATINO    Comments:   Goal 1.5-2.5 is out of protocol. If INR out of this range , consult with pharmacist or provider to advise.       Anticoagulation Care Providers     Provider Role Specialty Phone number    Naga Russell MD Referring [facility] [phone]            See the Encounter Report to view Anticoagulation Flowsheet and Dosing Calendar (Go to Encounters tab  in chart review, and find the Anticoagulation Therapy Visit)        Jen Leon RN

## 2020-02-03 NOTE — TELEPHONE ENCOUNTER
Reason for Call:  INR    Who is calling?  Home Care: [facility]    Phone number:  [phone]    Fax number:      Name of caller: Sofia    INR Value:  1.2    Are there any other concerns:  Yes: Missed Friday, Saturday, Sunday doses.    Can we leave a detailed message on this number? YES    Phone number patient can be reached at: Home number on file [phone] (home)      Call taken on 2/3/2020 at 2:00 PM by Eva Ewing

## 2020-02-03 NOTE — PROGRESS NOTES
ANTICOAGULATION FOLLOW-UP CLINIC VISIT    Patient Name:  Nan Doran  Date:  2/3/2020  Contact Type:  Telephone    SUBJECTIVE:  Patient Findings     Positives:   Missed doses    Comments:   INR 1.2 reported by home care nurse. Patient missed warfarin dose Friday ,Saturday and . No other change or concern.         Clinical Outcomes     Comments:   INR 1.2 reported by home care nurse. Patient missed warfarin dose Friday ,Saturday and . No other change or concern.            OBJECTIVE    INR   Date Value Ref Range Status   2020 1.2 (A) 0.90 - 1.10 Final       ASSESSMENT / PLAN  INR assessment SUB    Recheck INR In: 3 DAYS    INR Location [facility] INR      Anticoagulation Summary  As of 2/3/2020    INR goal:   1.5-2.0   TTR:   43.8 % (4 d)   INR used for dosin.2! (2/3/2020)   Warfarin maintenance plan:   2 mg (1 mg x 2) every Tue, Thu, Sat; 1 mg (1 mg x 1) all other days   Full warfarin instructions:   2/3: 2 mg; 2: 2 mg; Otherwise 2 mg every Tue, Thu, Sat; 1 mg all other days   Weekly warfarin total:   10 mg   Plan last modified:   Jen Leon RN (2020)   Next INR check:   2020   Priority:   High   Target end date:   Indefinite    Indications    Chronic anticoagulation [Z79.01]  Chronic atrial fibrillation [I48.20]             Anticoagulation Episode Summary     INR check location:       Preferred lab:       Send INR reminders to:   CON PATINO    Comments:   Goal 1.5-2.5 is out of protocol. If INR out of this range , consult with pharmacist or provider to advise.       Anticoagulation Care Providers     Provider Role Specialty Phone number    Naga Russell MD Referring [facility] [phone]            See the Encounter Report to view Anticoagulation Flowsheet and Dosing Calendar (Go to Encounters tab in chart review, and find the Anticoagulation Therapy Visit)        Jen Leon RN

## 2020-02-04 NOTE — TELEPHONE ENCOUNTER
*Also asking for Protonix Oral Tab DR 20mg. Sig: Take 1 tab by mouth daily before meal, #30. Please pend as I did not see on med list.

## 2020-02-05 NOTE — TELEPHONE ENCOUNTER
Routing refill request to provider for review/approval because:  Medication is reported/historical    Alyse Hare BSN, RN

## 2020-02-06 NOTE — PROGRESS NOTES
ANTICOAGULATION FOLLOW-UP CLINIC VISIT    Patient Name:  Nan Doran  Date:  2020  Contact Type:  Telephone    SUBJECTIVE:         OBJECTIVE    INR   Date Value Ref Range Status   2020 1.3 (A) 0.90 - 1.10 Final       ASSESSMENT / PLAN  INR assessment SUB    Recheck INR In: 4 DAYS    INR Location [facility] INR      Anticoagulation Summary  As of 2020    INR goal:   1.5-2.0   TTR:   25.0 % (1 wk)   INR used for dosin.3! (2020)   Warfarin maintenance plan:   2 mg (1 mg x 2) every Tue, Thu, Sat; 1 mg (1 mg x 1) all other days   Full warfarin instructions:   : 3 mg; : 2 mg; : 2 mg; Otherwise 2 mg every Tue, Thu, Sat; 1 mg all other days   Weekly warfarin total:   10 mg   Plan last modified:   Jen Leon RN (2020)   Next INR check:   2/10/2020   Priority:   High   Target end date:   Indefinite    Indications    Chronic anticoagulation [Z79.01]  Chronic atrial fibrillation [I48.20]             Anticoagulation Episode Summary     INR check location:       Preferred lab:       Send INR reminders to:   CON PATINO    Comments:   Goal 1.5-2.5 is out of protocol. If INR out of this range , consult with pharmacist or provider to advise.       Anticoagulation Care Providers     Provider Role Specialty Phone number    Naga Russell MD Referring [facility] [phone]            See the Encounter Report to view Anticoagulation Flowsheet and Dosing Calendar (Go to Encounters tab in chart review, and find the Anticoagulation Therapy Visit)        Jen Leon RN

## 2020-02-06 NOTE — TELEPHONE ENCOUNTER
Left message for nurse to call me back. Need to know if any missed warfarin doses. Need to verify meds on med list. Note amiodarone on list but most recent hospital discharge shows this as discontinued.Jen Leon RN

## 2020-02-06 NOTE — TELEPHONE ENCOUNTER
Spoke with nurse Sofia. Verified patient is not taking amiodarone as this was discontinued at hospital 12/31/19. No missed doses in past 3 days. No signs or symptoms of bleeding. Nurse will recheck INR on Monday. Verbal orders given to Sofia.  Jen Leon RN  Medication list updated and Amiodarone discontinued.  Jen Leon RN

## 2020-02-10 NOTE — TELEPHONE ENCOUNTER
Home care nurse calling with inr results drawn today was 1.6 please call with verbal orders or to advise.

## 2020-02-10 NOTE — TELEPHONE ENCOUNTER
Spoke with home care nurse Sofia. INR up to 1.6 from 1.3 on 3 days ago. Nurse reports Medication changes today from recent cardiology visit include discontinuing Losartan and decreasing Bumex to 0.5 mg daily. Also Torsemide removed from med list as this had been discontinued at hospital. Nurse will be seeing patient again on Thursday and then plans to discontinue home care. Per cardiology note, patient will be evaluated for Watchman procedure and can decide if warfarin will be managed by cardiology or pcp. Home care nurse will discuss this with patient. See anticoagulation encounter. Jen Leon RN

## 2020-02-10 NOTE — PROGRESS NOTES
ANTICOAGULATION FOLLOW-UP CLINIC VISIT    Patient Name:  Nan Doran  Date:  2/10/2020  Contact Type:  Telephone    SUBJECTIVE:  Patient Findings     Comments:   Spoke with home care nurse Sofia. INR up to 1.6 from 1.3 on 3 days ago. Nurse reports Medication changes today from recent cardiology visit include discontinuing Losartan and decreasing Bumex to 0.5 mg daily. Also Torsemide removed from med list as this had been discontinued at hospital. Nurse will be seeing patient again on Thursday and then plans to discontinue home care. Per cardiology note, patient will be evaluated for Watchman procedure and can decide if warfarin will be managed by cardiology or pcp. Home care nurse will discuss this with patient.         Clinical Outcomes     Comments:   Spoke with home care nurse Sofia. INR up to 1.6 from 1.3 on 3 days ago. Nurse reports Medication changes today from recent cardiology visit include discontinuing Losartan and decreasing Bumex to 0.5 mg daily. Also Torsemide removed from med list as this had been discontinued at hospital. Nurse will be seeing patient again on Thursday and then plans to discontinue home care. Per cardiology note, patient will be evaluated for Watchman procedure and can decide if warfarin will be managed by cardiology or pcp. Home care nurse will discuss this with patient.            OBJECTIVE    INR   Date Value Ref Range Status   02/10/2020 1.6 (A) 0.90 - 1.10 Final       ASSESSMENT / PLAN  INR assessment THER    Recheck INR In: 3 DAYS    INR Location [facility] INR      Anticoagulation Summary  As of 2/10/2020    INR goal:   1.5-2.0   TTR:   28.0 % (1.6 wk)   INR used for dosin.6 (2/10/2020)   Warfarin maintenance plan:   No maintenance plan   Full warfarin instructions:   2/10: 3 mg; : 2 mg; : 2 mg   Plan last modified:   Jen Leon RN (2/10/2020)   Next INR check:   2020   Priority:   High   Target end date:   Indefinite    Indications    Chronic  anticoagulation [Z79.01]  Chronic atrial fibrillation [I48.20]             Anticoagulation Episode Summary     INR check location:       Preferred lab:       Send INR reminders to:   CON PATINO    Comments:   Goal 1.5-2.5 is out of protocol. If INR out of this range , consult with pharmacist or provider to advise.       Anticoagulation Care Providers     Provider Role Specialty Phone number    Naga Russell MD Referring [facility] [phone]            See the Encounter Report to view Anticoagulation Flowsheet and Dosing Calendar (Go to Encounters tab in chart review, and find the Anticoagulation Therapy Visit)        Jen Leon RN

## 2020-02-13 NOTE — TELEPHONE ENCOUNTER
Paulina states that they are discharging patient from home care today and this is there last visit.  Also states patient's INR was 1.8 today.    Thank you.

## 2020-02-13 NOTE — PROGRESS NOTES
ANTICOAGULATION FOLLOW-UP CLINIC VISIT    Patient Name:  Nan Doran  Date:  2020  Contact Type:  Telephone    SUBJECTIVE:  Patient Findings     Comments:   Home care reports INR 1.8 with no new change or concerns. Being discharged from home care today. Will need to come to clinic for next INR. Verbal order given to nurse and she will have patient schedule INR apt in clinic         Clinical Outcomes     Negatives:   Major bleeding event, Thromboembolic event, Anticoagulation-related hospital admission, Anticoagulation-related ED visit, Anticoagulation-related fatality    Comments:   Home care reports INR 1.8 with no new change or concerns. Being discharged from home care today. Will need to come to clinic for next INR. Verbal order given to nurse and she will have patient schedule INR apt in clinic            OBJECTIVE    INR   Date Value Ref Range Status   2020 1.8 (A) 0.90 - 1.10 Final       ASSESSMENT / PLAN  INR assessment THER    Recheck INR In: 6 DAYS    INR Location [facility] INR      Anticoagulation Summary  As of 2020    INR goal:   1.5-2.0   TTR:   43.5 % (2 wk)   INR used for dosin.8 (2020)   Warfarin maintenance plan:   3 mg (1 mg x 3) every Mon, Thu; 2 mg (1 mg x 2) all other days   Full warfarin instructions:   3 mg every Mon, Thu; 2 mg all other days   Weekly warfarin total:   16 mg   Plan last modified:   Jen Leon RN (2020)   Next INR check:   2020   Priority:   High   Target end date:   Indefinite    Indications    Chronic anticoagulation [Z79.01]  Chronic atrial fibrillation [I48.20]             Anticoagulation Episode Summary     INR check location:       Preferred lab:       Send INR reminders to:   CON PATINO    Comments:   Goal 1.5-2.5 is out of protocol. If INR out of this range , consult with pharmacist or provider to advise.       Anticoagulation Care Providers     Provider Role Specialty Phone number    Naga Russell MD Referring  [facility] [phone]            See the Encounter Report to view Anticoagulation Flowsheet and Dosing Calendar (Go to Encounters tab in chart review, and find the Anticoagulation Therapy Visit)        Jen Leon RN

## 2020-02-13 NOTE — TELEPHONE ENCOUNTER
Discussed results with nurse Paulina. See anticoagulation encounter. Patient discharged from home care today and will need to come to clinic for next INR.  Jen Leon RN

## 2020-02-18 NOTE — PROGRESS NOTES
.Please place or confirm orders for upcoming lab appointment on 02/19/2020. (SCHEDULING NOTES STATE INR)  Thank You,  Jackie

## 2020-02-18 NOTE — PROGRESS NOTES
I ordered INR but this is usually done through our INR clinic or the cardiology INR clinic as I don't do the longterm management of this. Please verify patient seeing ours or there's and further INR directed to them in future. Thanks. Naga Russell MD

## 2020-02-18 NOTE — PROGRESS NOTES
Please review lab orders sign and close encounter. Batsheva Roth MA/STACEY    Lab only appt for INR 2/19/20

## 2020-02-18 NOTE — PROGRESS NOTES
Yes this should be on INR nurse schedule. I changed the appointment to my scheduled at same time.  Jen Leon RN

## 2020-02-19 NOTE — PROGRESS NOTES
ANTICOAGULATION FOLLOW-UP CLINIC VISIT    Patient Name:  Nan Doran  Date:  2020  Contact Type:  Face to Face    SUBJECTIVE:  Patient Findings     Comments:   Seen for first time face to face with daughter present. Patient very hard of hearing. On warfarin since hospitalization  . Previously had been on apixaban for a-fib/stroke. Pacemaker placed. During hospital stay was anemic and possible GI bleed. Switched to warfarin with aim for lower range of 1.5-2.5. discharged from home care last week. Possible plan for Watchman procedure in future. Therapeutic today at same as a week ago. She did take 2 mg instead of 3 mg on Monday. Will repeat same dose and recheck in 1wk.         Clinical Outcomes     Comments:   Seen for first time face to face with daughter present. Patient very hard of hearing. On warfarin since hospitalization  . Previously had been on apixaban for a-fib/stroke. Pacemaker placed. During hospital stay was anemic and possible GI bleed. Switched to warfarin with aim for lower range of 1.5-2.5. discharged from home care last week. Possible plan for Watchman procedure in future. Therapeutic today at same as a week ago. She did take 2 mg instead of 3 mg on Monday. Will repeat same dose and recheck in 1wk.            OBJECTIVE    INR Protime   Date Value Ref Range Status   2020 1.8 (A) 0.86 - 1.14 Final       ASSESSMENT / PLAN  INR assessment THER    Recheck INR In: 1 WEEK    INR Location Clinic      Anticoagulation Summary  As of 2020    INR goal:   1.5-2.0   TTR:   60.4 % (2.9 wk)   INR used for dosin.8 (2020)   Warfarin maintenance plan:   3 mg (1 mg x 3) every Thu; 2 mg (1 mg x 2) all other days   Full warfarin instructions:   3 mg every Thu; 2 mg all other days   Weekly warfarin total:   15 mg   Plan last modified:   Jen Leon RN (2020)   Next INR check:   2020   Priority:   High   Target end date:   Indefinite    Indications    Chronic  anticoagulation [Z79.01]  Chronic atrial fibrillation [I48.20]             Anticoagulation Episode Summary     INR check location:       Preferred lab:       Send INR reminders to:   CON PATINO    Comments:   Goal 1.5-2.5 is out of protocol. If INR out of this range , consult with pharmacist or provider to advise.       Anticoagulation Care Providers     Provider Role Specialty Phone number    Naga Russell MD Referring [facility] [phone]            See the Encounter Report to view Anticoagulation Flowsheet and Dosing Calendar (Go to Encounters tab in chart review, and find the Anticoagulation Therapy Visit)        Jen Leon RN

## 2020-02-20 NOTE — TELEPHONE ENCOUNTER
Reason for Call:  Other prescription    Detailed comments: pharmacy is calling stating needs clarification on direction for RX: warfarin ANTICOAGULANT 1 MG PO tablet, states directions were cut off when sent. Please call to confirm directions. Thank you.    Phone Number Patient can be reached at: [phone]    Best Time:     Can we leave a detailed message on this number? YES    Call taken on 2/20/2020 at 12:19 PM by Desiree Dooley

## 2020-02-20 NOTE — TELEPHONE ENCOUNTER
Received form to be filled out from [facility]-Physician orders for admission/start of services. Placed in your basket to complete.Batsheva Roth MA/TC

## 2020-02-20 NOTE — TELEPHONE ENCOUNTER
Confirmed dose to pharmacist. Advised it was off of last anticoag appointment note.  Savi Elder BSN, RN

## 2020-02-20 NOTE — TELEPHONE ENCOUNTER
"Requested Prescriptions   Signed Prescriptions Disp Refills    warfarin ANTICOAGULANT 1 MG PO tablet 60 tablet 1     Sig: Take daily as directed. Current dose: take 1 tablet by oral route daily on monday, wednesday, friday, sunday and take 2 tablets daily on tue       Vitamin K Antagonists Failed - 2/20/2020 11:03 AM        Failed - INR is within goal in the past 6 weeks     Confirm INR is within goal in the past 6 weeks.     Recent Labs   Lab Test 02/19/20   INR 1.8*                       Passed - Recent (12 mo) or future (30 days) visit within the authorizing provider's specialty     Patient has had an office visit with the authorizing provider or a provider within the authorizing providers department within the previous 12 mos or has a future within next 30 days. See \"Patient Info\" tab in inbasket, or \"Choose Columns\" in Meds & Orders section of the refill encounter.              Passed - Medication is active on med list        Passed - Patient is 18 years of age or older        Passed - Patient is not pregnant        Passed - No positive pregnancy on file in past 12 months          "

## 2020-02-20 NOTE — TELEPHONE ENCOUNTER
Victoriano states she would like to clarify change in dosage for the warfarin.  Please call.    Thank you/.\\.

## 2020-02-21 NOTE — TELEPHONE ENCOUNTER
I called patient and she states she is seeing cardiology next Wednesday at [facility] and wants to have her INR done there. She will ask them to send results to this clinic and follow up with me by phone for further directions on her warfarin dose. States she will pass this information on to her daughter.  Jen Leon RN

## 2020-02-21 NOTE — TELEPHONE ENCOUNTER
Dr Russell completed the form. I have faxed this to [facility].Batsheva Roth MA/TC

## 2020-02-21 NOTE — TELEPHONE ENCOUNTER
Daughter states she would like the INR order faxed over to the [facility] lab so patient can have it done there one time next week.    Thank you.

## 2020-02-21 NOTE — TELEPHONE ENCOUNTER
This phone # is incorrect and wrong person. The message did not list the name of daughter. Jen Leon RN

## 2020-02-21 NOTE — TELEPHONE ENCOUNTER
"Voicemail states \"you have reached Clare\"   Left message to call me back. Nee fax # and date patient is going to lab.  Jen Leon RN    "

## 2020-02-26 NOTE — TELEPHONE ENCOUNTER
Reason for Call:  Other call     Detailed comments: Please fax patients INR orders ASAP, per [facility] these were requested a week ago and patient is at the facility to have labs done.     Fax: [phone]    Phone Number Patient can be reached at: Other phone number:  [phone]    Best Time:     Can we leave a detailed message on this number? YES    Call taken on 2/26/2020 at 11:55 AM by Eva Ewing

## 2020-02-27 NOTE — TELEPHONE ENCOUNTER
Reason for Call:  Other call back    Detailed comments: INR taken at [facility] yesterday, has not received results and does not know how much medication to take today. Please call to discuss. Thank you.    Phone Number Patient can be reached at: [phone]    Best Time:     Can we leave a detailed message on this number? YES    Call taken on 2/27/2020 at 8:06 AM by Desiree Dooley

## 2020-02-27 NOTE — PROGRESS NOTES
ANTICOAGULATION FOLLOW-UP CLINIC VISIT    Patient Name:  Nan Doran  Date:  2020  Contact Type:  Telephone    SUBJECTIVE:  Patient Findings     Comments:   INR 1.6 done at [facility] while at cardiology apt yesterday.discussed results with patient and instructions given to patient and read back. Also discussed results with daughter Lana and INR apt scheduled as Lana is her transportation.        Clinical Outcomes     Negatives:   Major bleeding event, Thromboembolic event, Anticoagulation-related hospital admission, Anticoagulation-related ED visit, Anticoagulation-related fatality    Comments:   INR 1.6 done at [facility] while at cardiology apt yesterday.discussed results with patient and instructions given to patient and read back. Also discussed results with daughter Lana and INR apt scheduled as Lana is her transportation.           OBJECTIVE    INR   Date Value Ref Range Status   2020 1.6 (A) 0.90 - 1.10 Final       ASSESSMENT / PLAN  INR assessment THER    Recheck INR In: 10 DAYS    INR Location Outside lab      Anticoagulation Summary  As of 2020    INR goal:   1.5-2.0   TTR:   70.7 % (3.9 wk)   INR used for dosin.6 (2020)   Warfarin maintenance plan:   3 mg (1 mg x 3) every Mon, Thu; 2 mg (1 mg x 2) all other days   Full warfarin instructions:   3 mg every Mon, Thu; 2 mg all other days   Weekly warfarin total:   16 mg   Plan last modified:   Jen Leon RN (2020)   Next INR check:   3/10/2020   Priority:   High   Target end date:   Indefinite    Indications    Chronic anticoagulation [Z79.01]  Chronic atrial fibrillation [I48.20]             Anticoagulation Episode Summary     INR check location:       Preferred lab:       Send INR reminders to:   CON PATINO    Comments:   Goal 1.5-2.5 is out of protocol. If INR out of this range , consult with pharmacist or provider to advise.       Anticoagulation Care Providers     Provider Role Specialty Phone number    Drew  Naga Hein MD [facility] [phone]            See the Encounter Report to view Anticoagulation Flowsheet and Dosing Calendar (Go to Encounters tab in chart review, and find the Anticoagulation Therapy Visit)        Jen Leon RN

## 2020-02-27 NOTE — TELEPHONE ENCOUNTER
I spoke with both patient and daughter Lana on phone. INR done in lab at [facility] yesterday while at cardiology apt. INR = 1.6. Discussed results and warfarin dose with patient and daughter and both were given instructions. Recheck INR on 3/10. Patient is moving to assisted living type apt tomorrow and daughter states she may not have a phone set up for a while. Advised patient and daughter to sign YAN form when in clinic for consent to discuss with daughter or son.   See anticoagulation encounter. Jen Leon RN

## 2020-03-09 NOTE — TELEPHONE ENCOUNTER
Daughter, Lana Cardoso, has been told her mother's INR is being done at a different facility, [facility] now. She has an appt. to see Jen tomorrow in [place]. Have orders been sent to another facility? If so,  Will Jen still manage her INR? Please advise. Ok to leave a detailed message.

## 2020-03-10 NOTE — TELEPHONE ENCOUNTER
I contacted the [facility] at [phone] and left message for nursing to call me back regarding who would be managing her warfarin and INR. Jen Leon RN

## 2020-03-10 NOTE — PROGRESS NOTES
ANTICOAGULATION FOLLOW-UP CLINIC VISIT    Patient Name:  Nan Doran  Date:  3/10/2020  Contact Type:  Face to Face    SUBJECTIVE:  Patient Findings     Comments:   Here with daughter Lana. Patient moved to [facility] facility. She is not sure if there is an option for INR to be done there but she and daughter will check into this. Denies any bleeding or bruising. Her therapeutic range is 1.5 - 2.5 due to bleeding history.         Clinical Outcomes     Comments:   Here with daughter Lana. Patient moved to [facility] facility. She is not sure if there is an option for INR to be done there but she and daughter will check into this. Denies any bleeding or bruising. Her therapeutic range is 1.5 - 2.5 due to bleeding history.            OBJECTIVE    INR Protime   Date Value Ref Range Status   03/10/2020 2.2 (A) 0.86 - 1.14 Final       ASSESSMENT / PLAN  INR assessment THER    Recheck INR In: 2 WEEKS    INR Location Clinic      Anticoagulation Summary  As of 3/10/2020    INR goal:   1.5-2.0   TTR:   69.4 % (1.3 mo)   INR used for dosin.2! (3/10/2020)   Warfarin maintenance plan:   3 mg (1 mg x 3) every Mon; 2 mg (1 mg x 2) all other days   Full warfarin instructions:   3 mg every Mon; 2 mg all other days   Weekly warfarin total:   15 mg   Plan last modified:   Jen Leon RN (3/10/2020)   Next INR check:   3/24/2020   Priority:   High   Target end date:   Indefinite    Indications    Chronic anticoagulation [Z79.01]  Chronic atrial fibrillation [I48.20]             Anticoagulation Episode Summary     INR check location:       Preferred lab:       Send INR reminders to:   CON PATINO    Comments:   Goal 1.5-2.5 is out of protocol. If INR out of this range , consult with pharmacist or provider to advise.       Anticoagulation Care Providers     Provider Role Specialty Phone number    Naga Russell MD Referring [facility] [phone]            See the Encounter Report to view  Anticoagulation Flowsheet and Dosing Calendar (Go to Encounters tab in chart review, and find the Anticoagulation Therapy Visit)        Jen Leon RN

## 2020-03-10 NOTE — TELEPHONE ENCOUNTER
Daughter was in clinic today with patient at INR apt. We discussed message below. Patient and daughter will discuss with care center. Jen Leon RN

## 2020-03-11 NOTE — TELEPHONE ENCOUNTER
Nurse from [facility] left message on voicemail asking for faxed orders for warfarin dose. Please fax to [phone].  Orders faxed as requested. Jen Leon RN

## 2020-03-18 NOTE — PROGRESS NOTES
HPI:    I am seeing Nan Doran for the 1st time. She is a 84 year old female with complicated medical history including CHF, v-tach (s/p ICD), a fib (on anticoagulation), anemia, CVA, hearing loss, CKD, HTN, here for Hospital follow-up:    She lives in assisted living.    V-tach - currently she denies any palpitations, chest pain, shortness of breath.  Evaluation and treatment:   She had shock from her ICD due to v tach.   She was seen in ED on 3/14/20    She was Hospitalized 3/15/20 to 3/16/20   She was seen by EP and placed on Amiodarone 200 mg bid for 2 weeks then daily.   She is advised to follow with her cardiologist.    Anemia - denies bleeding anywhere.  Evaluation and treatment:   Her hgb has been stable over time.   Nothing acute.      CBC RESULTS:   Recent Labs   Lab Test 10/28/19  1141   WBC 7.5   RBC 3.38*   HGB 9.5*   HCT 33.5*   MCV 99   MCH 28.1   MCHC 28.4*   RDW 16.3*        ROS:    Const: No fevers, weight changes or night sweats recently.  ENT: No runny nose, sore throat or ear pain.  Resp: No cough or shortness of breath.  CV: No chest pain, dizziness or cardiac palpitations.  GI: No nausea, vomiting, diarrhea or constipation. Denies blood in stools or black stools.  : No dysuria, frequency or hematuria.  The rest of the ROS negative, other than listed on HPI    Social History     Socioeconomic History     Marital status:      Spouse name: Not on file     Number of children: Not on file     Years of education: Not on file     Highest education level: Not on file   Occupational History     Not on file   Social Needs     Financial resource strain: Not on file     Food insecurity     Worry: Not on file     Inability: Not on file     Transportation needs     Medical: Not on file     Non-medical: Not on file   Tobacco Use     Smoking status: Former Smoker     Last attempt to quit: 1984     Years since quittin.2     Smokeless tobacco: Never Used   Substance and Sexual Activity      Alcohol use: No     Drug use: No     Sexual activity: Not Currently     Partners: Male   Lifestyle     Physical activity     Days per week: Not on file     Minutes per session: Not on file     Stress: Not on file   Relationships     Social connections     Talks on phone: Not on file     Gets together: Not on file     Attends [employer] witness service: Not on file     Active member of club or organization: Not on file     Attends meetings of clubs or organizations: Not on file     Relationship status: Not on file     Intimate partner violence     Fear of current or ex partner: Not on file     Emotionally abused: Not on file     Physically abused: Not on file     Forced sexual activity: Not on file   Other Topics Concern     Parent/sibling w/ CABG, MI or angioplasty before 65F 55M? Yes     Comment: daugthers X2 lupus related   Social History Narrative     Not on file       Exam:    /72   Pulse 90   Temp 97.9  F (36.6  C) (Tympanic)   Wt 53.5 kg (118 lb)   BMI 23.83 kg/m      Gen: Healthy appearing elderly female in no acute distress. She is hard of hearing. She is here with her daughter.  ENT: TM's normal. Oropharynx normal. Oral mucosa moist without lesions.  Eyes: Conjunctiva and sclera normal. Pupils react normally to light. No nystagmus.  Neck: No enlarged lymph nodes, thyromegally or other masses.  Lungs: Good air movement and otherwise clear.  CV: Heart RRR with no murmurs.     Assessment and Plan - Decision Making    1. Hospital discharge follow-up    Per HPI      2. Paroxysmal ventricular tachycardia (H)    Per HPI      3. Anemia, unspecified type    Per HPI        Written instructions given as follows:    Patient Instructions   Follow the advice of your cardiologist.    See Dr. Russell in 2 months.

## 2020-03-18 NOTE — NURSING NOTE
"Chief Complaint   Patient presents with     [facility] F/U     [facility] 03/14/20       Initial /72   Pulse 90   Temp 97.9  F (36.6  C) (Tympanic)   Wt 53.5 kg (118 lb)   BMI 23.83 kg/m   Estimated body mass index is 23.83 kg/m  as calculated from the following:    Height as of 1/28/20: 1.499 m (4' 11\").    Weight as of this encounter: 53.5 kg (118 lb).  Medication Reconciliation: complete    JOSE G Dhillon MA    "

## 2020-03-18 NOTE — PROGRESS NOTES
ANTICOAGULATION FOLLOW-UP CLINIC VISIT    Patient Name:  Nan Doran  Date:  3/18/2020  Contact Type:  Face to Face    SUBJECTIVE:  Patient Findings     Positives:   Change in health, Change in medications, Hospital admission    Comments:   Hospital for cardiac event. Added amiodarone on 3/16. Here with daughter who is concerned about patient coming to clinic with quarantine. Patient does have option of getting INR done at facility but is refusing this for now. Plan recheck in clinic in 1 wk. Informed that amiodarone will increase INR and will need to be monitored closely.        Clinical Outcomes     Comments:   Hospital for cardiac event. Added amiodarone on 3/16. Here with daughter who is concerned about patient coming to clinic with quarantine. Patient does have option of getting INR done at facility but is refusing this for now. Plan recheck in clinic in 1 wk. Informed that amiodarone will increase INR and will need to be monitored closely.           OBJECTIVE    INR Protime   Date Value Ref Range Status   2020 1.5 (A) 0.86 - 1.14 Final       ASSESSMENT / PLAN  INR assessment THER goal is 1.5-2.5   Recheck INR In: 1 WEEK    INR Location Clinic      Anticoagulation Summary  As of 3/18/2020    INR goal:   1.5-2.0   TTR:   60.8 % (1.6 mo)   INR used for dosin.5 (3/18/2020)   Warfarin maintenance plan:   3 mg (1 mg x 3) every Mon; 2 mg (1 mg x 2) all other days   Full warfarin instructions:   3/21: 1 mg; 3/23: 2 mg; Otherwise 3 mg every Mon; 2 mg all other days   Weekly warfarin total:   15 mg   Plan last modified:   Jen Leon RN (3/10/2020)   Next INR check:   3/25/2020   Priority:   High   Target end date:   Indefinite    Indications    Chronic anticoagulation [Z79.01]  Chronic atrial fibrillation [I48.20]             Anticoagulation Episode Summary     INR check location:       Preferred lab:       Send INR reminders to:   CON PATINO    Comments:   Goal 1.5-2.5 is out of protocol. If  INR out of this range , consult with pharmacist or provider to advise.       Anticoagulation Care Providers     Provider Role Specialty Phone number    Naga Russell MD Referring [facility] [phone]            See the Encounter Report to view Anticoagulation Flowsheet and Dosing Calendar (Go to Encounters tab in chart review, and find the Anticoagulation Therapy Visit)        Jen Leon RN

## 2020-03-19 PROBLEM — I47.29 PAROXYSMAL VENTRICULAR TACHYCARDIA (H): Status: ACTIVE | Noted: 2020-01-01

## 2020-03-24 NOTE — TELEPHONE ENCOUNTER
Message left at [facility] for nurse to call me back regarding INR results today. Also faxed results to them at [phone]. Jen Leon RN

## 2020-03-24 NOTE — TELEPHONE ENCOUNTER
Reason for Call:  Other diagnosis code    Detailed comments: Daniela from [facility] is calling to ask for another diagnosis code for the PT INR stated Medicare needs this to be cover under insurance   Please call to advice  Thank you     Phone Number Patient can be reached at: Other phone number:  na    Best Time: na    Can we leave a detailed message on this number? YES    Call taken on 3/24/2020 at 10:20 AM by Karen Sanchez

## 2020-03-24 NOTE — PROGRESS NOTES
ANTICOAGULATION FOLLOW-UP CLINIC VISIT    Patient Name:  Nan Doran  Date:  3/24/2020  Contact Type:  Telephone    SUBJECTIVE:         OBJECTIVE    INR   Date Value Ref Range Status   2020 2.00 (H) 0.86 - 1.14 Final     Comment:     This test is intended for monitoring Coumadin therapy.  Results are not   accurate in patients with prolonged INR due to factor deficiency.         ASSESSMENT / PLAN  INR assessment THER    Recheck INR In: 1 WEEK    INR Location Clinic      Anticoagulation Summary  As of 3/24/2020    INR goal:   1.5-2.0   TTR:   65.4 % (1.8 mo)   INR used for dosin.00 (3/24/2020)   Warfarin maintenance plan:   No maintenance plan   Full warfarin instructions:   3/24: 1 mg; 3/25: 2 mg; 3/26: 2 mg; 3/27: 2 mg; 3/28: 1 mg; 3/29: 2 mg; 3/30: 2 mg   Plan last modified:   Jen Leon RN (3/24/2020)   Next INR check:   3/31/2020   Priority:   High   Target end date:   Indefinite    Indications    Chronic anticoagulation [Z79.01]  Chronic atrial fibrillation [I48.20]             Anticoagulation Episode Summary     INR check location:       Preferred lab:       Send INR reminders to:   CON PAITNO    Comments:   Goal 1.5-2.5 is out of protocol. If INR out of this range , consult with pharmacist or provider to advise.       Anticoagulation Care Providers     Provider Role Specialty Phone number    Naga Russell MD Referring [facility] [phone]            See the Encounter Report to view Anticoagulation Flowsheet and Dosing Calendar (Go to Encounters tab in chart review, and find the Anticoagulation Therapy Visit)    Dosage adjustment made based on physician directed care plan.    Jen Leon RN

## 2020-03-25 NOTE — TELEPHONE ENCOUNTER
Left message again for nurse to call me back. Need to know if faxed orders were recieved for warfarin dose. Jen Leon RN

## 2020-03-26 NOTE — TELEPHONE ENCOUNTER
Message received on voice mail from [phone] saying orders for warfarin dose received by fax. Jen Leon RN

## 2020-03-26 NOTE — TELEPHONE ENCOUNTER
Reason for Call:  Other call back    Detailed comments: Daughter is calling to discuss improper dosing from assisted living center. Please call to discuss. Thank you.    Phone Number Patient can be reached at: [phone]    Best Time:     Can we leave a detailed message on this number? YES    Call taken on 3/26/2020 at 1:01 PM by Desiree Dooley

## 2020-03-26 NOTE — TELEPHONE ENCOUNTER
Daughter informs me of what she thinks was an error in her warfarin dose this past week and thinks patient had 2 mg instead of 1 mg warfarin on Tuesday. The nursing staff was faxed the dose instructions after INR done on Tuesday.. Has next INR on 3/31. Jen Leon RN

## 2020-03-31 NOTE — TELEPHONE ENCOUNTER
INR results today 2.5. Left message at [facility] on nurse voicemail for nurse to call me back to discuss results and current dose . Jen Leon RN

## 2020-03-31 NOTE — PROGRESS NOTES
ANTICOAGULATION FOLLOW-UP CLINIC VISIT    Patient Name:  Nan Doran  Date:  3/31/2020  Contact Type:  Telephone    SUBJECTIVE:  Patient Findings     Comments:   INR goal is 1.5-2.0. INR today 2.5        Clinical Outcomes     Comments:   INR goal is 1.5-2.0. INR today 2.5           OBJECTIVE    INR   Date Value Ref Range Status   2020 2.50 (H) 0.86 - 1.14 Final     Comment:     This test is intended for monitoring Coumadin therapy.  Results are not   accurate in patients with prolonged INR due to factor deficiency.         ASSESSMENT / PLAN  INR assessment SUPRA    Recheck INR In: 2 WEEKS    INR Location Clinic      Anticoagulation Summary  As of 3/31/2020    INR goal:   1.5-2.0   TTR:   58.0 % (2 mo)   INR used for dosin.50! (3/31/2020)   Warfarin maintenance plan:   1 mg (1 mg x 1) every Tue, Thu, Sat; 2 mg (1 mg x 2) all other days   Full warfarin instructions:   3/31: 1 mg; 4/1: 2 mg; 4/2: 1 mg; 4/3: 2 mg; 4/4: 1 mg; 4/5: 2 mg; 4/6: 2 mg; Otherwise 1 mg every Tue, Thu, Sat; 2 mg all other days   Weekly warfarin total:   11 mg   Plan last modified:   Jen Leon RN (3/31/2020)   Next INR check:   2020   Priority:   High   Target end date:   Indefinite    Indications    Chronic anticoagulation [Z79.01]  Chronic atrial fibrillation [I48.20]             Anticoagulation Episode Summary     INR check location:       Preferred lab:       Send INR reminders to:   CON PATINO    Comments:   Goal 1.5-2.5 is out of protocol. If INR out of this range , consult with pharmacist or provider to advise.       Anticoagulation Care Providers     Provider Role Specialty Phone number    Naga Russell MD Referring [facility] [phone]            See the Encounter Report to view Anticoagulation Flowsheet and Dosing Calendar (Go to Encounters tab in chart review, and find the Anticoagulation Therapy Visit)        Jen Leon RN

## 2020-03-31 NOTE — TELEPHONE ENCOUNTER
Spoke with nurse Tamy again. She reports patient amiodarone dose will be decreased starting today to 200 mg daily. She is also trying to get nurse visit for INR for patient at facility instead of patient coming to clinic. . Will plan to do INR in 1 week. Warfarin dose orders faxed to facility at [phone]. Jen Leon RN

## 2020-03-31 NOTE — TELEPHONE ENCOUNTER
Tamy given verbal orders for skilled nursing, does need orders signed. Will fax order for Dr. Naga Russell to sign.    Also needs last office visit note faxed -  please fax      Ange GARCIAN, RN, CPN

## 2020-03-31 NOTE — TELEPHONE ENCOUNTER
Tamy states she would like orders for skilled nursing care for disease management and INR draws.  She would also like a copy of the last office visit notes.  Please fax to [phone].    Thank you.

## 2020-03-31 NOTE — TELEPHONE ENCOUNTER
I spoke with nurse Tamy. She reports the patient received the warfarin dose as instructed at last INR. I gave her verbal orders but will also fax orders for warfarin dose and next INR in 2 wks. She will also check with patient on the option of having the INR done at facility by lab instead of patient coming to the clinic for INR. Jen Leon RN

## 2020-04-01 NOTE — TELEPHONE ENCOUNTER
":: please assist. Please seen telephone message from yesterday \"orders\".  I believe this is what they're looking for.  These had been faxed to [facility] yesterday.  Alba Moncada RN    "

## 2020-04-01 NOTE — TELEPHONE ENCOUNTER
Home care nurse calling to request orders faxed to her on when patient is due for next inr follow up and copy of home care orders faxed to [phone]. [facility] orders for home care faxed to assisted living but not home care.

## 2020-04-01 NOTE — TELEPHONE ENCOUNTER
I faxed the order for warfarin dosing and when next INR due. I am not sure what other home care orders she is wanting faxed. Jen Leon RN

## 2020-04-01 NOTE — TELEPHONE ENCOUNTER
I only faxed office notes that they were requesting, not orders. I called and spoke to Jessica a little while ago and TANK Hou gave the verbal orders they were requesting.Batsheva Roth MA/STACEY

## 2020-04-01 NOTE — TELEPHONE ENCOUNTER
Home care nurse requesting orders for skilled nursing visits for disease management.  Verbal order given per standing orders.  Savi GARCIAN, RN

## 2020-04-02 NOTE — TELEPHONE ENCOUNTER
Elin states the patient needs verbal orders for skilled nursing for disease and INR management 1 time a week for 8 weeks and 3 PRN's. Please advise

## 2020-04-02 NOTE — TELEPHONE ENCOUNTER
See message yesterday.  Nurse given verbal orders as requested per standing orders.  Savi Elder BSN, RN

## 2020-04-07 NOTE — PROGRESS NOTES
ANTICOAGULATION FOLLOW-UP CLINIC VISIT    Patient Name:  Nan Doran  Date:  2020  Contact Type:  Telephone    SUBJECTIVE:  Patient Findings     Comments:   INR range is 1.5-2.5. home care nurse reports INR 2.2 with no concerns or changes to report. Verbal orders left on voicemail for home care nurse and she replied she received message and dose orders need to be faxed to assisted living at [phone]. Plan recheck in 1 wk.        Clinical Outcomes     Negatives:   Major bleeding event, Thromboembolic event, Anticoagulation-related hospital admission, Anticoagulation-related ED visit, Anticoagulation-related fatality    Comments:   INR range is 1.5-2.5. home care nurse reports INR 2.2 with no concerns or changes to report. Verbal orders left on voicemail for home care nurse and she replied she received message and dose orders need to be faxed to assisted living at [phone]. Plan recheck in 1 wk.           OBJECTIVE    INR   Date Value Ref Range Status   2020 2.2 (A) 0.90 - 1.10 Final       ASSESSMENT / PLAN  INR assessment THER    Recheck INR In: 1 WEEK    INR Location Homecare INR      Anticoagulation Summary  As of 2020    INR goal:   1.5-2.0   TTR:   52.4 % (2.3 mo)   INR used for dosin.2! (2020)   Warfarin maintenance plan:   1 mg (1 mg x 1) every Tue, Thu, Sat; 2 mg (1 mg x 2) all other days   Full warfarin instructions:   1 mg every Tue, Thu, Sat; 2 mg all other days   Weekly warfarin total:   11 mg   No change documented:   Jen Leon RN   Plan last modified:   Jen Leon RN (3/31/2020)   Next INR check:   2020   Priority:   High   Target end date:   Indefinite    Indications    Chronic anticoagulation [Z79.01]  Chronic atrial fibrillation [I48.20]             Anticoagulation Episode Summary     INR check location:       Preferred lab:       Send INR reminders to:   CON PATINO    Comments:   Goal 1.5-2.5 is out of protocol. If INR out of this range ,  consult with pharmacist or provider to advise.       Anticoagulation Care Providers     Provider Role Specialty Phone number    Naga Russell MD Referring [facility] [phone]            See the Encounter Report to view Anticoagulation Flowsheet and Dosing Calendar (Go to Encounters tab in chart review, and find the Anticoagulation Therapy Visit)        Jen Leon RN

## 2020-04-07 NOTE — TELEPHONE ENCOUNTER
Left message for nurse to call me back. Left message for continued warfarin dose of 1 mg tu,th,sa and 2 mg rest of days if now changes , concerns or new medications. Recheck INR in 1 week. Jen Leon RN

## 2020-04-07 NOTE — TELEPHONE ENCOUNTER
Reason for Call:  Form, our goal is to have forms completed with 72 hours, however, some forms may require a visit or additional information.    Type of letter, form or note:  Home Health Certification    Who is the form from?: Home care    Where did the form come from: form was faxed in    What clinic location was the form placed at?: [place]    Where the form was placed: Given to MA/RN    What number is listed as a contact on the form?: [phone]       Additional comments: Placed in folder on RN's desk    Call taken on 4/7/2020 at 9:26 AM by Batsheva Roth

## 2020-04-07 NOTE — TELEPHONE ENCOUNTER
Reason for Call:  INR    Who is calling?  [facility]:     Phone number:  [phone]    Fax number:  N/A    Name of caller: Elin    INR Value:  2.2    Are there any other concerns:  No    Can we leave a detailed message on this number? YES    Phone number patient can be reached at: Home number on file [phone] (home)      Call taken on 4/7/2020 at 9:52 AM by Ana Crockett

## 2020-04-07 NOTE — TELEPHONE ENCOUNTER
Elin from [facility] called to say she agrees with the dosing for the patient but would like dosing faxed to patient' assisted living facility at [phone].

## 2020-04-14 NOTE — TELEPHONE ENCOUNTER
RN - would you assume patient is calling in regards to HHC form that was faxed in on 4/7/20? Per RN note on HHC forms med list was reconciled. Eva Ewing TC/Pt Rep

## 2020-04-14 NOTE — TELEPHONE ENCOUNTER
Andrea states she faxed over a medication list to be signed and returned back on 4/7 has not received this back. Need forms returned as soon as possible so they can refill patients medications . Fax to [phone]

## 2020-04-14 NOTE — TELEPHONE ENCOUNTER
Spoke with nurse Worthington and she was given instructions to continue same warfarin dose and recheck INR in 1 week. Instructions need to be faxed to assisted living facility at [phone].See anticoagulation encounter. Jen Leon RN

## 2020-04-14 NOTE — PROGRESS NOTES
ANTICOAGULATION FOLLOW-UP CLINIC VISIT    Patient Name:  Nan Doran  Date:  2020  Contact Type:  Telephone    SUBJECTIVE:  Patient Findings     Comments:   INR 2.1 today reported by home care nurse. No changes or concerns reported. Spoke with nurse Elin and she was given instructions to continue same warfarin dose and recheck INR in 1 week. Instructions need to be faxed to assisted living facility at [phone].          Clinical Outcomes     Comments:   INR 2.1 today reported by home care nurse. No changes or concerns reported. Spoke with nurse Elin and she was given instructions to continue same warfarin dose and recheck INR in 1 week. Instructions need to be faxed to assisted living facility at [phone].             OBJECTIVE    INR   Date Value Ref Range Status   2020 2.1 (A) 0.90 - 1.10 Final       ASSESSMENT / PLAN  INR assessment THER range is 1.5- 2.5   Recheck INR In: 1 WEEK    INR Location [facility]R      Anticoagulation Summary  As of 2020    INR goal:   1.5-2.0   TTR:   47.5 % (2.5 mo)   INR used for dosin.1! (2020)   Warfarin maintenance plan:   1 mg (1 mg x 1) every Tue, Thu, Sat; 2 mg (1 mg x 2) all other days   Full warfarin instructions:   1 mg every Tue, Thu, Sat; 2 mg all other days   Weekly warfarin total:   11 mg   No change documented:   Jen Leon RN   Plan last modified:   Jen Leon RN (3/31/2020)   Next INR check:   2020   Priority:   High   Target end date:   Indefinite    Indications    Chronic anticoagulation [Z79.01]  Chronic atrial fibrillation [I48.20]             Anticoagulation Episode Summary     INR check location:       Preferred lab:       Send INR reminders to:   CON PATINO    Comments:   Goal 1.5-2.5 is out of protocol. If INR out of this range , consult with pharmacist or provider to advise.       Anticoagulation Care Providers     Provider Role Specialty Phone number    Naga Russell MD Referring [facility] [phone]            See the Encounter Report to view Anticoagulation Flowsheet and Dosing Calendar (Go to Encounters tab in chart review, and find the Anticoagulation Therapy Visit)        Jen Leon RN

## 2020-04-21 NOTE — PROGRESS NOTES
ANTICOAGULATION FOLLOW-UP CLINIC VISIT    Patient Name:  Nan Doran  Date:  2020  Contact Type:  Telephone    SUBJECTIVE:  Patient Findings     Comments:   Home care nurse reported INR 2.0 and asked that orders be faxed to [phone]. Spoke with Tamy at [facility] [phone]. She reports no changes or concerns for patient. They set meds up for the patient. Plan continue same warfarin dose and recheck INR in 2 weeks. Orders faxed to [phone] as requested.        Clinical Outcomes     Negatives:   Major bleeding event, Thromboembolic event, Anticoagulation-related hospital admission, Anticoagulation-related ED visit, Anticoagulation-related fatality    Comments:   Home care nurse reported INR 2.0 and asked that orders be faxed to [phone]. Spoke with Tamy at [facility] [phone]. She reports no changes or concerns for patient. They set meds up for the patient. Plan continue same warfarin dose and recheck INR in 2 weeks. Orders faxed to [phone] as requested.           OBJECTIVE    INR   Date Value Ref Range Status   2020 2.0 (A) 0.90 - 1.10 Final       ASSESSMENT / PLAN  INR assessment THER    Recheck INR In: 2 WEEKS    INR Location [facility]      Anticoagulation Summary  As of 2020    INR goal:   1.5-2.0   TTR:   43.5 % (2.7 mo)   INR used for dosin.0 (2020)   Warfarin maintenance plan:   1 mg (1 mg x 1) every Tue, Thu, Sat; 2 mg (1 mg x 2) all other days   Full warfarin instructions:   1 mg every Tue, Thu, Sat; 2 mg all other days   Weekly warfarin total:   11 mg   No change documented:   Jen Leon RN   Plan last modified:   Jen Leon RN (3/31/2020)   Next INR check:   2020   Priority:   High   Target end date:   Indefinite    Indications    Chronic anticoagulation [Z79.01]  Chronic atrial fibrillation [I48.20]             Anticoagulation Episode Summary     INR check location:       Preferred lab:       Send INR reminders to:    CON PATINO    Comments:   Goal 1.5-2.5 is out of protocol. If INR out of this range , consult with pharmacist or provider to advise.       Anticoagulation Care Providers     Provider Role Specialty Phone number    Naga Russell MD [facility] [phone]            See the Encounter Report to view Anticoagulation Flowsheet and Dosing Calendar (Go to Encounters tab in chart review, and find the Anticoagulation Therapy Visit)        Jen Leon RN

## 2020-04-21 NOTE — TELEPHONE ENCOUNTER
Spoke with Tamy at [facility] [phone]. She reports no changes or concerns for patient. They set meds up for the patient. Plan continue same warfarin dose and recheck INR in 2 weeks. Orders faxed to [phone] as requested.  Jen Leon RN  See anticoagulation encounter. Jen Leon RN

## 2020-04-28 NOTE — TELEPHONE ENCOUNTER
I spoke with Tamy at [facility] [phone]. She state there have been no changes to diet or medications and no known new concerns with patient. She asked that dosing orders be faxed to [phone]. See anticoagulation encounter. Jen Leon RN

## 2020-04-28 NOTE — TELEPHONE ENCOUNTER
Reason for Call:  INR    Who is calling?  Home Care: [facility]    Phone number:  [phone]    Fax number:  Assisted Living please fax to [phone] where they are managing her medications    Name of caller: Deepa    INR Value:  3.1    Are there any other concerns: please fax to [phone] where they are managing her medications No    Can we leave a detailed message on this number? YES    Call taken on 4/28/2020 at 10:31 AM by Kateryna Baldwin

## 2020-04-28 NOTE — PROGRESS NOTES
ANTICOAGULATION FOLLOW-UP CLINIC VISIT    Patient Name:  Nan Doran  Date:  4/28/2020  Contact Type:  Telephone    SUBJECTIVE:  Patient Findings     Comments:   INR 3.1 today reported by home care nurse. Therapeutic range is 1.5- 2.5. Home care is doing INR due to COVID quarantine. The assisted living nurses administer medications. I spoke with Tamy at [phone]. She states there have been no changes to diet or medications and no known new concerns with patient. No known reason for increase in INR. She asked that dosing orders be faxed to [phone].        Clinical Outcomes     Comments:   INR 3.1 today reported by home care nurse. Therapeutic range is 1.5- 2.5. Home care is doing INR due to COVID quarantine. The assisted living nurses administer medications. I spoke with Tamy at [place] [phone]. She states there have been no changes to diet or medications and no known new concerns with patient. No known reason for increase in INR. She asked that dosing orders be faxed to [phone].           OBJECTIVE    INR   Date Value Ref Range Status   04/28/2020 3.1 (A) 0.90 - 1.10 Final       ASSESSMENT / PLAN  INR assessment SUPRA    Recheck INR In: 1 WEEK    INR Location [facility] INR      Anticoagulation Summary  As of 4/28/2020    INR goal:   1.5-2.0   TTR:   40.0 % (3 mo)   INR used for dosing:   3.1! (4/28/2020)   Warfarin maintenance plan:   1 mg (1 mg x 1) every Tue, Thu, Sat; 2 mg (1 mg x 2) all other days   Full warfarin instructions:   4/28: Hold; Otherwise 1 mg every Tue, Thu, Sat; 2 mg all other days   Weekly warfarin total:   11 mg   Plan last modified:   Jen Leon RN (3/31/2020)   Next INR check:   5/5/2020   Priority:   High   Target end date:   Indefinite    Indications    Chronic anticoagulation [Z79.01]  Chronic atrial fibrillation [I48.20]             Anticoagulation Episode Summary     INR check location:       Preferred lab:       Send INR  reminders to:   CON PATINO    Comments:   Goal 1.5-2.5 is out of protocol. If INR out of this range , consult with pharmacist or provider to advise.       Anticoagulation Care Providers     Provider Role Specialty Phone number    Naga Russell MD Referring [facility] [phone]            See the Encounter Report to view Anticoagulation Flowsheet and Dosing Calendar (Go to Encounters tab in chart review, and find the Anticoagulation Therapy Visit)    Dosage adjustment made based on physician directed care plan.    Jen Leon RN

## 2020-04-29 NOTE — TELEPHONE ENCOUNTER
I spoke with nurse . Informed that orders were faxed yesterday and I will fax them again. Jen Leon RN

## 2020-04-29 NOTE — TELEPHONE ENCOUNTER
Need inr orders from yesterdays inr faxed to [phone] alexia Morley. States has not received this.

## 2020-05-01 NOTE — TELEPHONE ENCOUNTER
Reason for Call:  Other call back    Detailed comments: home care is calling would like follow up to encounter dated 04/29/20 for INR follow up. Please call to discuss. Thank you.    Phone Number Patient can be reached at: [phone]    Best Time:     Can we leave a detailed message on this number? YES    Call taken on 5/1/2020 at 2:31 PM by Desiree Dooley

## 2020-05-05 NOTE — TELEPHONE ENCOUNTER
Left message for home care nurse with verbal warfarin dose instructions and recheck INR in 1 wk. Also spoke with nurse Tamy at [facility] at [phone]. Discussed results and gave verbal order with dose. Also faxed dose instructions to [phone]. See anticoagulation encounter. Jen Leon RN

## 2020-05-05 NOTE — TELEPHONE ENCOUNTER
Home care nurse calling with inr results drawn this am, 3.8 no other changes, Please call with orders or to advise.

## 2020-05-05 NOTE — PROGRESS NOTES
ANTICOAGULATION FOLLOW-UP CLINIC VISIT    Patient Name:  Nan Doran  Date:  5/5/2020  Contact Type:  Telephone    SUBJECTIVE:  Patient Findings     Comments:   Left message for home care nurse with verbal warfarin dose instructions and recheck INR in 1 wk. Also spoke with nurse Morelos at [facility] at [phone]. She reports no change in medications and no new concerns. Reports is eating well. Dose was held one wk ago as instructed.Discussed results and gave verbal order with dose. Also faxed dose instructions to [phone]. Therapeutic range is 1.5- 2.5.        Clinical Outcomes     Negatives:   Major bleeding event, Thromboembolic event, Anticoagulation-related hospital admission, Anticoagulation-related ED visit, Anticoagulation-related fatality    Comments:   Left message for home care nurse with verbal warfarin dose instructions and recheck INR in 1 wk. Also spoke with nurse Morelos at [facility] at [phone]. She reports no change in medications and no new concerns. Reports is eating well. Dose was held one wk ago as instructed.Discussed results and gave verbal order with dose. Also faxed dose instructions to [phone]. Therapeutic range is 1.5- 2.5.           OBJECTIVE    INR   Date Value Ref Range Status   05/05/2020 3.8 (A) 0.90 - 1.10 Final       ASSESSMENT / PLAN  INR assessment SUPRA range is 1.5-2.5   Recheck INR In: 1 WEEK    INR Location [facility] INR      Anticoagulation Summary  As of 5/5/2020    INR goal:   1.5-2.0   TTR:   37.1 % (3.2 mo)   INR used for dosing:   3.8! (5/5/2020)   Warfarin maintenance plan:   1 mg (1 mg x 1) every Tue, Thu, Sat; 2 mg (1 mg x 2) all other days   Full warfarin instructions:   5/5: Hold; 5/6: 1 mg; 5/10: 1 mg; Otherwise 1 mg every Tue, Thu, Sat; 2 mg all other days   Weekly warfarin total:   11 mg   Plan last modified:   Jen Leon RN (3/31/2020)   Next INR check:   5/12/2020   Priority:   High   Target end date:   Indefinite     Indications    Chronic anticoagulation [Z79.01]  Chronic atrial fibrillation [I48.20]             Anticoagulation Episode Summary     INR check location:       Preferred lab:       Send INR reminders to:   CON PATINO    Comments:   Goal 1.5-2.5 is out of protocol. If INR out of this range , consult with pharmacist or provider to advise.       Anticoagulation Care Providers     Provider Role Specialty Phone number    Naga Russell MD Referring [facility] [phone]            See the Encounter Report to view Anticoagulation Flowsheet and Dosing Calendar (Go to Encounters tab in chart review, and find the Anticoagulation Therapy Visit)        Jen Leon RN

## 2020-05-07 NOTE — TELEPHONE ENCOUNTER
Patient calling is having problems with her feet and legs swelling, wondering if her meds should be adjusted. Declined  Appointment wants a message sent. States she is now in an assisted living and is not sure where they get her meds from.

## 2020-05-07 NOTE — TELEPHONE ENCOUNTER
Pt notified.  Tamy FU at [facility] notified.  Orders printed and faxed to [phone].  Savi GARCIAN, RN

## 2020-05-07 NOTE — TELEPHONE ENCOUNTER
Ok to increase bumex to 1mg for 4 days and will need follow-up if not improving. To ER if worse or shortness of breath. Naga Russell MD

## 2020-05-07 NOTE — TELEPHONE ENCOUNTER
Pt states she has been having some lower extremity edema.  On Tuesday she had some feet swelling where a finger poke would leave an indentation.  Now it has moved up her leg, swelling is 1/3-1/2 of her leg length. She has gained 1.5 lbs since Tuesday.  She denies any cough, sob or problems breathing.  She states she is only using 0.5 mg bumex daily and is wondering if she can increase this or what else can she do?  Savi GARCIAN, RN

## 2020-05-12 NOTE — TELEPHONE ENCOUNTER
Left message on voicemail for home care nurse with orders.   Called Tamy at [facility] [phone]. She reports no change in medications for patient. No reported bleeding or bruising. No reported change in diet or illness. Nurse was given verbal orders for dose change and recheck in 1 wk. Orders faxed to [phone]. See anticoagulation encounter. Jen Leon RN

## 2020-05-12 NOTE — TELEPHONE ENCOUNTER
Home care nurse calling with inr results drawn today was 3.2 please call with verbal orders or to advise.

## 2020-05-12 NOTE — PROGRESS NOTES
ANTICOAGULATION FOLLOW-UP CLINIC VISIT    Patient Name:  Nan Doran  Date:  5/12/2020  Contact Type:  Telephone    SUBJECTIVE:  Patient Findings     Comments:   Left message on voicemail for home care nurse with orders.   Called Tamy at [facility] [phone]. She reports no change in medications for patient. No reported bleeding or bruising. No reported change in diet or illness. Nurse was given verbal orders for dose change and recheck in 1 wk. Orders faxed to [phone].        Clinical Outcomes     Negatives:   Major bleeding event, Thromboembolic event, Anticoagulation-related hospital admission, Anticoagulation-related ED visit, Anticoagulation-related fatality    Comments:   Left message on voicemail for home care nurse with orders.   Called Tamy at [facility] [phone]. She reports no change in medications for patient. No reported bleeding or bruising. No reported change in diet or illness. Nurse was given verbal orders for dose change and recheck in 1 wk. Orders faxed to [phone].           OBJECTIVE    Recent labs: (last 7 days)     05/12/20   INR 3.2*       ASSESSMENT / PLAN  INR assessment SUPRA    Recheck INR In: 1 WEEK    INR Location [facility]      Anticoagulation Summary  As of 5/12/2020    INR goal:   1.5-2.0   TTR:   34.6 % (3.4 mo)   INR used for dosing:   3.2! (5/12/2020)   Warfarin maintenance plan:   1 mg (1 mg x 1) every day   Full warfarin instructions:   5/12: Hold; Otherwise 1 mg every day   Weekly warfarin total:   7 mg   Plan last modified:   Jen Leon RN (5/12/2020)   Next INR check:   5/19/2020   Priority:   High   Target end date:   Indefinite    Indications    Chronic anticoagulation [Z79.01]  Chronic atrial fibrillation [I48.20]             Anticoagulation Episode Summary     INR check location:       Preferred lab:       Send INR reminders to:   CON PATINO    Comments:   Goal 1.5-2.5 is out of protocol. If INR out of this range ,  consult with pharmacist or provider to advise.       Anticoagulation Care Providers     Provider Role Specialty Phone number    Naga Russell MD Referring [facility] [phone]            See the Encounter Report to view Anticoagulation Flowsheet and Dosing Calendar (Go to Encounters tab in chart review, and find the Anticoagulation Therapy Visit)        Jen Leon RN

## 2020-05-15 NOTE — TELEPHONE ENCOUNTER
Bruce from [facility] would like a call back from Inr nurse as patient is having a watchman device implanted on June 2. Need to coordinate inr.

## 2020-05-18 NOTE — PROGRESS NOTES
[facility]  [address] [id number] [phone]  Dept: [phone]    PRE-OP EVALUATION:  Today's date: 2020    Nan Doran (: 1935) presents for pre-operative evaluation assessment as requested by Dr. Lozano.  She requires evaluation and anesthesia risk assessment prior to undergoing surgery/procedure for treatment of watchman procedure.  At assisted living here with daugther    Proposed Surgery/ Procedure: Watchman procedure  Date of Surgery/ Procedure: 2020  Time of Surgery/ Procedure:[facility]/Surgical Facility:[facility]  Fax number for surgical facility:   Primary Physician: Naga Russell  Type of Anesthesia Anticipated: General    Patient has a Health Care Directive or Living Will:  YES     1. Yes - Do you have a history of heart attack, stroke, stent, bypass or surgery on an artery in the head, neck, heart or legs?  2. NO - Do you ever have any pain or discomfort in your chest?  3. Yes - Do you have a history of  Heart Failure?  4. Yes- Are you troubled by shortness of breath when: walking on the level, up a slight hill or at night?  5. NO - Do you currently have a cold, bronchitis or other respiratory infection?  6. NO - Do you have a cough, shortness of breath or wheezing?  7. NO - Do you sometimes get pains in the calves of your legs when you walk?  8. NO - Do you or anyone in your family have previous history of blood clots?  9. YES - Do you or does anyone in your family have a serious bleeding problem such as prolonged bleeding following surgeries or cuts?  10.YES - Have you ever had problems with anemia or been told to take iron pills?  11. YES - Have you had any abnormal blood loss such as black, tarry or bloody stools, or abnormal vaginal bleeding?  12. YES - Have you ever had a blood transfusion?  13. NO - Have you or any of your relatives ever had problems with anesthesia?  14. YES - Do you have sleep apnea, excessive snoring or daytime  drowsiness?  15. NO - Do you have any prosthetic heart valves?  16. NO - Do you have prosthetic joints?  17. NO - Is there any chance that you may be pregnant?      HPI:     HPI related to upcoming procedure: history a.fib      A-FIB - Patient has a longstanding history of chronic A-fib. Current treatment regimen includes Warfarin for stroke prevention and denies significant symptoms of lightheadedness, palpitations or dyspnea.     ANEMIA - Patient has a recent history of moderate-severe anemia, which has not been symptomatic. Work up to date has revealed. Patient not interested in in scoping.      HYPERTENSION - Patient has longstanding history of HTN , currently denies any symptoms referable to elevated blood pressure. Specifically denies chest pain, palpitations, dyspnea, orthopnea, PND or peripheral edema. Blood pressure readings have been in normal range. Current medication regimen is as listed below. Patient denies any side effects of medication.     RENAL INSUFFICIENCY - Patient has a longstanding history of moderate-severe chronic renal insufficiency.       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       Displaced fracture of medial malleolus of unspecified tibia, initial encounter for closed fracture 06/10/2019     Priority:  Medium     XR 6/9/2019 showed a slightly oblique nondisplaced fracture of the medial malleolus.       Chronic atrial fibrillation 06/09/2019     Priority: Medium     First noted 6/9/2019. Presentation with atrial fibrillation and RVR.       CHF (congestive heart failure) (H) 06/09/2019     Priority: Medium     New 6/9/2019.  Likely diastolic dysfunction due to A-fib but echo pending.       Macrocytic anemia 04/20/2016     Priority: Medium     CKD (chronic kidney disease) stage 3, GFR 30-59 ml/min (H) 03/15/2012     Priority: Medium     Hyperlipidemia LDL goal <130 06/08/2011     Priority: Medium     Advanced directives, counseling/discussion 06/02/2011     Priority: Medium     Discussed Advance Directive planning with patient; information given to patient to review.           Gout 12/31/2010     Priority: Medium     Hypertension goal BP (blood pressure) < 140/90 12/31/2010     Priority: Medium      Past Medical History:   Diagnosis Date     Gout      HTN      Hypercholesteremia      Hyperglycemia      Past Surgical History:   Procedure Laterality Date     GYN SURGERY      tubal     HC DUPLEX SCAN EXTRACRANIAL, LIMITED UNILATERAL      normal carotid ultrasound 8/2006     HEAD & NECK SURGERY      tonsils     Current Outpatient Medications   Medication Sig Dispense Refill     acetaminophen (TYLENOL) 500 MG tablet Take 500 mg by mouth every 6 hours as needed for mild pain       allopurinol (ZYLOPRIM) 100 MG tablet Take 2 tablets (200 mg) by mouth daily For gout prevention 180 tablet 1     amiodarone (PACERONE) 200 MG tablet Take 1 tablet by mouth twice daily for 2 weeks then take 1 tablet daily thereafter.       bumetanide (BUMEX) 1 MG tablet Dose change per cardiology to 0.5 mg daily on 2/10/20 30 tablet 0     calcium carbonate (TUMS) 500 MG chewable tablet Take 1 chew tab by mouth every 4 hours as needed for heartburn       ferrous sulfate (FE TABS) 325 (65 Fe) MG EC tablet Take 1 tablet (325 mg) by mouth daily Iron  pill 90 tablet 1     metoprolol succinate ER (TOPROL-XL) 25 MG 24 hr tablet Take 1 tablet (25 mg) by mouth daily 90 tablet 1     order for DME Equipment being ordered: bilateral knee high compression socks 1 Device 0     simvastatin (ZOCOR) 20 MG tablet Take 1 tablet (20 mg) by mouth At Bedtime For cholesterol 90 tablet 1     warfarin ANTICOAGULANT (COUMADIN) 1 MG tablet Take daily as directed. Current dose is 1 mg ,, and 2 mg rest of week days. 192 tablet 0     OTC products: None, except as noted above    Allergies   Allergen Reactions     Doxycycline GI Disturbance     Erythromycin       Latex Allergy: NO    Social History     Tobacco Use     Smoking status: Former Smoker     Last attempt to quit: 1984     Years since quittin.4     Smokeless tobacco: Never Used   Substance Use Topics     Alcohol use: No     History   Drug Use No       REVIEW OF SYSTEMS:   CONSTITUTIONAL: NEGATIVE for fever, chills, change in weight  INTEGUMENTARY/SKIN: NEGATIVE for worrisome rashes, moles or lesions  ENT/MOUTH: NEGATIVE for ear, mouth and throat problems  RESP: NEGATIVE for significant cough or SOB  CV: NEGATIVE for chest pain, palpitations or peripheral edema  GI: NEGATIVE for nausea, abdominal pain, heartburn, or change in bowel habits, history hemorrhoid rare blood with wiping after bm and NOT interested in colonoscopy/egd.   : no urine changes or hematuria.  MUSCULOSKELETAL: NEGATIVE for significant arthralgias or myalgia  Leg swelling - on bumex  NEURO: NEGATIVE for weakness, dizziness or paresthesias  ENDOCRINE: NEGATIVE for temperature intolerance, skin/hair changes  HEME/ALLERGY/IMMUNE: NEGATIVE for bleeding problems  PSYCHIATRIC: NEGATIVE for changes in mood or affect    EXAM:   There were no vitals taken for this visit.   /63   Pulse 84   Temp 98.2  F (36.8  C) (Oral)   Wt 58.1 kg (128 lb)   BMI 25.85 kg/m     GENERAL APPEARANCE: healthy, alert and no distress  HENT: ear canals and TM's normal  and nose and mouth without ulcers or lesions  NECK: no adenopathy, no asymmetry, masses, or scars and thyroid normal to palpation  RESP: lungs clear to auscultation - no rales, rhonchi or wheezes  CV: IRIR and no murmur, click or rub   ABDOMEN: soft, nontender, no HSM or masses and bowel sounds normal  MS: extremities normal- no gross deformities noted and pitting 2+ lower extremity edema bilaterally  SKIN: no suspicious lesions or rashes  NEURO: Normal strength and tone, sensory exam grossly normal, mentation intact and speech normal  PSYCH: mentation appears normal and affect normal/bright    DIAGNOSTICS:     Labs Resulted Today: No results found for any visits on 05/18/20.   ekg not repeated.   Labs Drawn and in Process:   Unresulted Labs Ordered in the Past 30 Days of this Admission     Date and Time Order Name Status Description    5/18/2020 1447 FERRITIN In process     5/18/2020 1447 CBC WITH PLATELETS In process     5/18/2020 1447 TSH WITH FREE T4 REFLEX In process     5/18/2020 1447 COMPREHENSIVE METABOLIC PANEL In process             Recent Labs   Lab Test 05/12/20 05/05/20 02/07/20 11/12/19  1135 10/28/19  1141 08/12/19  1502   HGB  --   --   --   --   --   --  9.5* 9.8*   PLT  --   --   --   --   --   --  313 469*   INR 3.2* 3.8*   < >  --    < >  --   --   --    NA  --   --   --   --   --  139 138  --    POTASSIUM  --   --   --  5.3*  --  3.5 4.2  --    CR  --   --   --  1.82*  --  1.50* 1.47*  --     < > = values in this interval not displayed.      Cr=1.8/potassium = 4.9, hemoglobin =9.1, ferritin = 48 wbc=9.5 and aek=778.  Labs about same as past.   IMPRESSION:   Reason for surgery/procedure: a.fib//watchman's procedure  Diagnosis/reason for consult: a.fib/htn/anemia/peripheral edema.//fitness for surgery  Ok for surgery pending labs. Denies chest pain or shortness of breath. No active bleeding and patient NOT interested in egd/colonoscopy. Again with getting off coumadin in future hopefully because  of history anemia and increased risk for fall with age. Blood pressure stable. Continue meds. Prn bumex in future but ace/coban wraps given to legs prn and elevate legs/exercise a little more and limit sodium to <1500mcg daily. Recheck in 3 months with myself for med check - sooner if worse/new issues.   The proposed surgical procedure is considered INTERMEDIATE risk.    REVISED CARDIAC RISK INDEX  The patient has the following serious cardiovascular risks for perioperative complications such as (MI, PE, VFib and 3  AV Block):  No serious cardiac risks  INTERPRETATION: 2 risks: Class III (moderate risk - 6.6% complication rate)    The patient has the following additional risks for perioperative complications:  No identified additional risks      ICD-10-CM    1. Preop general physical exam  Z01.818        RECOMMENDATIONS:     --Consult hospital rounder / IM to assist post-op medical management  --Because of DVT or PE history, patient should be on low molecular weight heparin and wear compression hose before & after surgery    --Patient is to take all scheduled medications on the day of surgery EXCEPT for modifications listed below.    APPROVAL GIVEN to proceed with proposed procedure, without further diagnostic evaluation       Signed Electronically by: Naga Russell MD    Copy of this evaluation report is provided to requesting physician.    [facility] Preop Guidelines    Revised Cardiac Risk Index

## 2020-05-18 NOTE — TELEPHONE ENCOUNTER
Left message for Bruce to call me back. Message left for her stating patient's next INR in 5/19. Jen Leon RN

## 2020-05-19 NOTE — TELEPHONE ENCOUNTER
Discussed results with nurse Worthington. Patient scheduled for Watchman procedure on June 2. Nurse was given verbal order to continue warfarin 1 mg daily and recheck INR in 1 wk. Orders to be faxed to [facility] at [phone]. Jen Leon RN  See anticoagulation encounter.

## 2020-05-19 NOTE — TELEPHONE ENCOUNTER
Elin given verbal orders for skilled nursing as noted below  Elin states would also like OT orders for patient's edema. Given verbal orders for OT also      Ange GARCIAN, RN, CPN

## 2020-05-19 NOTE — TELEPHONE ENCOUNTER
Spoke with nurse Peg on phone. Patient having watchman procedure at [facility] on June 2. Provider would like INR to be in range of 2.0 or 2.5 for the procedure and if any questions can call Bruce at # listed. Patient will need weekly INR done post procedure for about 45 days. Next INR scheduled by home care on 5/26. Jen Leon RN

## 2020-05-19 NOTE — PROGRESS NOTES
ANTICOAGULATION FOLLOW-UP CLINIC VISIT    Patient Name:  Nan Doran  Date:  2020  Contact Type:  Telephone    SUBJECTIVE:  Patient Findings     Comments:   Home care reports INR 2.5 today with no missed dose or changes reported. Verbal orders given to Elin for dose and recheck in 1 wk . Orders faxed to [facility] at [phone]        Clinical Outcomes     Comments:   Home care reports INR 2.5 today with no missed dose or changes reported. Verbal orders given to Elin for dose and recheck in 1 wk . Orders faxed to [facility] at [phone]           OBJECTIVE    Recent labs: (last 7 days)     20   INR 2.5*       ASSESSMENT / PLAN  INR assessment THER range is 1.5-2.5   Recheck INR In: 1 WEEK    INR Location [facility]      Anticoagulation Summary  As of 2020    INR goal:   1.5-2.0   TTR:   32.4 % (3.7 mo)   INR used for dosin.5! (2020)   Warfarin maintenance plan:   1 mg (1 mg x 1) every day   Full warfarin instructions:   1 mg every day   Weekly warfarin total:   7 mg   No change documented:   Jen Leon RN   Plan last modified:   Jen Leon RN (2020)   Next INR check:   2020   Priority:   High   Target end date:   Indefinite    Indications    Chronic anticoagulation [Z79.01]  Chronic atrial fibrillation [I48.20]             Anticoagulation Episode Summary     INR check location:       Preferred lab:       Send INR reminders to:   CON PATINO    Comments:   Goal 1.5-2.5 is out of protocol. If INR out of this range , consult with pharmacist or provider to advise.       Anticoagulation Care Providers     Provider Role Specialty Phone number    Naga Russell MD Referring [facility] [phone]            See the Encounter Report to view Anticoagulation Flowsheet and Dosing Calendar (Go to Encounters tab in chart review, and find the Anticoagulation Therapy Visit)        Jen Leon RN

## 2020-05-19 NOTE — TELEPHONE ENCOUNTER
Reason for Call:  INR    Who is calling?  Home [facility]    Phone number:  [phone]    Fax number:  na    Name of caller: shaun    INR Value:  2.5    Are there any other concerns: pt has upcoming procedure to put in a watchman.  Dose inr accordingly  Can we leave a detailed message on this number? YES    Phone number patient can be reached at: Home number on file [phone] (home)      Call taken on 5/19/2020 at 9:33 AM by Gia Gomez

## 2020-05-19 NOTE — TELEPHONE ENCOUNTER
Reason for Call: Request for an order or referral:    Order or referral being requested: skilled nursing for 1 x a week    Date needed: as soon as possible    Has the patient been seen by the PCP for this problem? Not Applicable    Additional comments: none    Phone number Patient can be reached at:  Home number on file [phone] (home)    Best Time:  any    Can we leave a detailed message on this number?  YES    Call taken on 5/19/2020 at 9:37 AM by Gia Gomez

## 2020-05-26 NOTE — PROGRESS NOTES
ANTICOAGULATION FOLLOW-UP CLINIC VISIT    Patient Name:  Nan Doran  Date:  2020  Contact Type:  Telephone/ home care nurse    SUBJECTIVE:  Patient Findings     Comments:   The patient was assessed for diet, medication, and activity level changes, missed or extra doses, bruising or bleeding, with no problem findings.  Having watchman placed on . No holding instructions   Per INR orders 20 INR range is to be 1.5-2.5    Ange LEON, RN, CPN          Clinical Outcomes     Negatives:   Major bleeding event, Thromboembolic event, Anticoagulation-related hospital admission, Anticoagulation-related ED visit, Anticoagulation-related fatality    Comments:   The patient was assessed for diet, medication, and activity level changes, missed or extra doses, bruising or bleeding, with no problem findings.  Having watchman placed on . No holding instructions   Per INR orders 20 INR range is to be 1.5-2.5    Ange LEON, RN, CPN             OBJECTIVE    Recent labs: (last 7 days)     20   INR 2.5*       ASSESSMENT / PLAN  INR assessment THER    Recheck INR In: 3 DAYS    [facility]      Anticoagulation Summary  As of 2020    INR goal:   1.5-2.0   TTR:   30.5 % (3.9 mo)   INR used for dosin.5! (2020)   Warfarin maintenance plan:   1 mg (1 mg x 1) every day   Full warfarin instructions:   1 mg every day   Weekly warfarin total:   7 mg   No change documented:   Ange Johnson RN   Plan last modified:   Jen Leon RN (2020)   Next INR check:   2020   Priority:   High   Target end date:   Indefinite    Indications    Chronic anticoagulation [Z79.01]  Chronic atrial fibrillation [I48.20]             Anticoagulation Episode Summary     INR check location:       Preferred lab:       Send INR reminders to:   CON PATINO    Comments:   Goal 1.5-2.5 is out of protocol. If INR out of this range , consult with pharmacist or provider to advise.        Anticoagulation Care Providers     Provider Role Specialty Phone number    Naga Russell MD Referring [facility] [phone]            See the Encounter Report to view Anticoagulation Flowsheet and Dosing Calendar (Go to Encounters tab in chart review, and find the Anticoagulation Therapy Visit)        Ange Johnson, RN

## 2020-05-27 NOTE — TELEPHONE ENCOUNTER
Spoke with nurse Roxie. She has the info she needs. Patient is scheduled for watchman procedure on 6/2. Cardiology prefers INR to be in 2-2.5 range for the procedure. Has INR scheduled for Friday and then will need INR done weekly after procedure starting 6/9. Jen Leon RN

## 2020-05-27 NOTE — TELEPHONE ENCOUNTER
Bruce states she would like to talk to INR nurse about patient's procedure on 6-2-20 and her INR's.  Please call.    Thank you.

## 2020-05-27 NOTE — TELEPHONE ENCOUNTER
Spoke with Milli. She needs warfarin dose calendar insrtuctions faxed to her at # above. This is done. Jen Leon RN

## 2020-05-27 NOTE — TELEPHONE ENCOUNTER
Reason for Call:  Other Preop    Detailed comments: Bruce calling to get a copy of the office notes from Nan's pre-op on 5/18. She can see labs in care everywhere but not the note showing clearance.   Please fax to [phone]    Phone Number Patient can be reached at: Other phone number:  [phone]    Best Time: any    Can we leave a detailed message on this number? YES    Call taken on 5/27/2020 at 7:32 AM by Mellissa Leon

## 2020-05-29 NOTE — TELEPHONE ENCOUNTER
Attempted to reach SHER Singh, regarding pt message below. There was no answer. LM authorizing requested orders on provider's behalf. LM to return call to RN at [phone] with any questions.  RADHA WickN, RN

## 2020-05-29 NOTE — TELEPHONE ENCOUNTER
Reason for Call:  INR    Who is calling?  Home Care: [facility]    Phone number:  [phone]    Fax number:  na    Name of caller: Elin    INR Value:  2.7    Are there any other concerns:  No    Can we leave a detailed message on this number? YES    Phone number patient can be reached at: Home number on file [phone] (home)      Call taken on 5/29/2020 at 9:43 AM by Karen Sanchez

## 2020-05-29 NOTE — TELEPHONE ENCOUNTER
Reason for Call:  Other call back    Detailed comments: [facility] is calling to request orders for: Ok to discharge OT since cannot go into facility due to covid19. Please call to discuss. Thank you.    Phone Number Patient can be reached at: [phone]    Best Time:     Can we leave a detailed message on this number? YES    Call taken on 5/29/2020 at 4:01 PM by Desiree Dooley

## 2020-05-29 NOTE — PROGRESS NOTES
ANTICOAGULATION FOLLOW-UP CLINIC VISIT    Patient Name:  Nan Doran  Date:  2020  Contact Type:  Telephone    SUBJECTIVE:  Patient Findings     Comments:   The patient was assessed for diet, medication, and activity level changes, missed or extra doses, bruising or bleeding, with no problem findings.  Per 20 INR orders per Dr. Naga Russell, INR range to be 1.5/2.5  Spoke with home care nurseElin, patient having watchman procedure done , states she was instructed by provider to recheck INR on   anticoag calendar with dosing and next INR due faxed to [phone]    Ange LEON, RN, CPN          Clinical Outcomes     Negatives:   Major bleeding event, Thromboembolic event, Anticoagulation-related hospital admission, Anticoagulation-related ED visit, Anticoagulation-related fatality    Comments:   The patient was assessed for diet, medication, and activity level changes, missed or extra doses, bruising or bleeding, with no problem findings.  Per 20 INR orders per Dr. Naga Russell, INR range to be 1.5/2.5  Spoke with home care nurseElin, patient having watchman procedure done , states she was instructed by provider to recheck INR on   anticoag calendar with dosing and next INR due faxed to [phone]    Ange LEON, RN, CPN             OBJECTIVE    Recent labs: (last 7 days)     20   INR 2.7*       ASSESSMENT / PLAN  INR assessment SUPRA    Recheck INR In: 1 WEEK    INR Location [facility]      Anticoagulation Summary  As of 2020    INR goal:   1.5-2.0   TTR:   29.7 % (4 mo)   INR used for dosin.7! (2020)   Warfarin maintenance plan:   1 mg (1 mg x 1) every day   Full warfarin instructions:   : 0.5 mg; Otherwise 1 mg every day   Weekly warfarin total:   7 mg   Plan last modified:   Jen Leon RN (2020)   Next INR check:   2020   Priority:   High   Target end date:   Indefinite    Indications    Chronic anticoagulation  [Z79.01]  Chronic atrial fibrillation [I48.20]             Anticoagulation Episode Summary     INR check location:       Preferred lab:       Send INR reminders to:   CON PATINO    Comments:   Goal 1.5-2.5 is out of protocol. If INR out of this range , consult with pharmacist or provider to advise.       Anticoagulation Care Providers     Provider Role Specialty Phone number    Naga Russell MD Referring [facility] [phone]            See the Encounter Report to view Anticoagulation Flowsheet and Dosing Calendar (Go to Encounters tab in chart review, and find the Anticoagulation Therapy Visit)        Ange Johnson RN

## 2020-06-04 NOTE — PROGRESS NOTES
Clinic Care Coordination Contact  Community Health Worker Initial Outreach         Patient accepts CC: No, Patient has all resources at this time.     The Clinic Community Health Worker spoke with the patient today at the request of the PCP to discuss possible Clinic Care Coordination enrollment. The service was described to the patient and immediate needs were discussed. The patient declined enrollment at this time. The PCP is encouraged to refer in the future if the patient's needs change.    Teresa STEVE Community Health Worker  Clinic Care Coordination  [facility] : [place]  Phone: [phone]      Reason for Referral: Care Transition: Inpatient to outpatient

## 2020-06-04 NOTE — TELEPHONE ENCOUNTER
Reason for Call:  Other call back    Detailed comments: Patient says she was given a pill in the hospital to help her kidneys function better by a nurse. Patient wants to know what was the medication.    Phone Number Patient can be reached at: Home number on file [phone] (home)    Best Time: Any    Can we leave a detailed message on this number? Yes    Call taken on 6/4/2020 at 11:44 AM by Teresa Delcid MA

## 2020-06-05 NOTE — TELEPHONE ENCOUNTER
Incoming call received from Elin, from [facility]. Elin is calling to report pt's INR result today.    Elin states pt's INR this morning is 2.5.    Message routed to Anticoagulation Pool to review and address reported result.    RADHA WickN, RN

## 2020-06-05 NOTE — TELEPHONE ENCOUNTER
Ok guaifenesin 600mg ER 1 tab every 12 hours as needed. To ER if worsening shortness of breath/ fevers. Naga Russell MD

## 2020-06-05 NOTE — PROGRESS NOTES
ANTICOAGULATION FOLLOW-UP CLINIC VISIT    Patient Name:  Nan Doran  Date:  2020  Contact Type:  Telephone/ home care nurse    SUBJECTIVE:  Patient Findings     Comments:   Elin RN, with home care, confirmed pt held dose  and took 2 mg on  but otherwise is taking one mg daily.  These changes were due to procedure.          Clinical Outcomes     Comments:   Elin FU, with home care, confirmed pt held dose  and took 2 mg on  but otherwise is taking one mg daily.  These changes were due to procedure.             OBJECTIVE    Recent labs: (last 7 days)     20   INR 2.5*       ASSESSMENT / PLAN  INR assessment THER    Recheck INR In: 4 DAYS    INR Location [facility]R      Anticoagulation Summary  As of 2020    INR goal:   1.5-2.0   TTR:   28.1 % (4.2 mo)   INR used for dosin.5! (2020)   Warfarin maintenance plan:   1 mg (1 mg x 1) every day   Full warfarin instructions:   1 mg every day   Weekly warfarin total:   7 mg   No change documented:   Savi Elder RN   Plan last modified:   Jen Leon RN (2020)   Next INR check:   2020   Priority:   High   Target end date:   Indefinite    Indications    Chronic anticoagulation [Z79.01]  Chronic atrial fibrillation [I48.20]             Anticoagulation Episode Summary     INR check location:       Preferred lab:       Send INR reminders to:   CON PATINO    Comments:   Goal 1.5-2.5 is out of protocol. If INR out of this range , consult with pharmacist or provider to advise.       Anticoagulation Care Providers     Provider Role Specialty Phone number    Naga Russell MD Referring [facility] [phone]            See the Encounter Report to view Anticoagulation Flowsheet and Dosing Calendar (Go to Encounters tab in chart review, and find the Anticoagulation Therapy Visit)      Savi Elder RN

## 2020-06-05 NOTE — TELEPHONE ENCOUNTER
Elin FU confirmed pt held dose 5/29 and took 2 mg on 6/4 but otherwise is taking one mg daily.  Advised to continue one mg daily and recheck on Tuesday.  Savi GARCIAN, RN

## 2020-06-05 NOTE — TELEPHONE ENCOUNTER
Home care nurse calling with EL- patient had a procedure on on Tuesday for a watchman. Patient has developed a cough has crackling bilaterally in lower lower lobes. Wondering if patient could be given mucinex or some type of cough med - alyssia like order faxed to patients senior [facility] home [facility] fax [phone]

## 2020-06-08 NOTE — TELEPHONE ENCOUNTER
Reason for Call:  Form, our goal is to have forms completed with 72 hours, however, some forms may require a visit or additional information.    Type of letter, form or note:  Home Health Certification    Who is the form from?: Home care    Where did the form come from: form was faxed in    What clinic location was the form placed at?: [facility]    Where the form was placed: Given to MA/RN    What number is listed as a contact on the form?: [phone]       Additional comments:Placed on RN's desk     Call taken on 6/8/2020 at 1:31 PM by Batsheva Roth

## 2020-06-09 NOTE — TELEPHONE ENCOUNTER
Spoke with home care nurse, patient was in hospital last Tuesday for watchman procedure. Went home Wednesday and stayed with daughter. Did have a cough last Wednesday. Patient now back at assisted living facility and nurse saw patient today. Patient coughing is worsening, crackles in lungs, wheezing. SaO2 = 92%, patient normally is 94-95%. Patient does have +2 pitting edema. No shortness of breath but patient is very tired.    Patient would not have capability to do [facility] visit.       Reviewed message with Dr. Naga Russell, patient to go to ED for further evaluation. Elin home care nurse, instructed patient needs to go to ED    Ange Johnson BSN, RN, CPN

## 2020-06-09 NOTE — PROGRESS NOTES
ANTICOAGULATION FOLLOW-UP CLINIC VISIT    Patient Name:  Nan Doran  Date:  2020  Contact Type:  Telephone    SUBJECTIVE:  Patient Findings     Comments:   INR per home care done at [facility]. INR 2.0 . Note other message was sent by home care nurse to provider with concerns for worsening cough, crackles in lungs and wheezing. She was advised by provider to be evaluated in ED. Spoke with nurse Morley and she was given verbal orders to continue warfarin 1 mg daily and she requested orders faxed to her at [phone]. States she will also discuss these symptoms with home care nurse Elin. INR needs to be done weekly for 4 weeks post Watchman procedure.         Clinical Outcomes     Comments:   INR per home care done at [facility]. INR 2.0 . Note other message was sent by home care nurse to provider with concerns for worsening cough, crackles in lungs and wheezing. She was advised by provider to be evaluated in ED. Spoke with nurse Morley and she was given verbal orders to continue warfarin 1 mg daily and she requested orders faxed to her at [phone]. States she will also discuss these symptoms with home care nurse Elin. INR needs to be done weekly for 4 weeks post Watchman procedure.            OBJECTIVE    Recent labs: (last 7 days)     20   INR 2.0*       ASSESSMENT / PLAN  INR assessment THER    Recheck INR In: 1 WEEK    INR Location [facility] INR      Anticoagulation Summary  As of 2020    INR goal:   1.5-2.0   TTR:   27.2 % (4.4 mo)   INR used for dosin.0 (2020)   Warfarin maintenance plan:   1 mg (1 mg x 1) every day   Full warfarin instructions:   1 mg every day   Weekly warfarin total:   7 mg   No change documented:   Jen Leon RN   Plan last modified:   Jen Leon RN (2020)   Next INR check:   2020   Priority:   High   Target end date:   Indefinite    Indications    Chronic anticoagulation [Z79.01]  Chronic atrial fibrillation [I48.20]              Anticoagulation Episode Summary     INR check location:       Preferred lab:       Send INR reminders to:   CON PATINO    Comments:   Goal 1.5-2.5 is out of protocol. If INR out of this range , consult with pharmacist or provider to advise.       Anticoagulation Care Providers     Provider Role Specialty Phone number    Naga Russell MD Referring [facility] [phone]            See the Encounter Report to view Anticoagulation Flowsheet and Dosing Calendar (Go to Encounters tab in chart review, and find the Anticoagulation Therapy Visit)    Dosage adjustment made based on physician directed care plan.    Jen Leon RN

## 2020-06-09 NOTE — TELEPHONE ENCOUNTER
A user error has taken place: encounter opened in error, closed for administrative reasons.     Presented today for port flush, tolerated well

## 2020-06-09 NOTE — TELEPHONE ENCOUNTER
Spoke with Milli at [facility]. She was given verbal order for warfarin dose and orders also faxed as requested. See anticoagulation encounter. Jen Leon RN

## 2020-06-09 NOTE — TELEPHONE ENCOUNTER
Patient has a cough, wheezing, and crackling in lungs. No fever, O2 92%, facility is concerned with COVID after was in the  hospital for surgery. Please call to advise. Eva Ewing TC/Pt Rep

## 2020-06-09 NOTE — TELEPHONE ENCOUNTER
Reason for Call:  INR    Who is calling?  Home Care: [facility]    Phone number:  [phone]    Fax number:      Name of caller: Elin    INR Value:  2.0    Are there any other concerns:  No    Can we leave a detailed message on this number? YES    Phone number patient can be reached at: Home number on file [phone] (home)      Call taken on 6/9/2020 at 11:29 AM by Eva Ewing

## 2020-06-09 NOTE — TELEPHONE ENCOUNTER
Spoke with nurse Worthington and she was given verbal orders for warfarin and recheck of INR in 1 wk. See anticoagulation encounter. Note she states patient was also advised to be evaluated in ED for symptoms of worsened cough as noted on other telephone encounter.  Jen Leon RN

## 2020-06-10 NOTE — TELEPHONE ENCOUNTER
Spoke with Milli, discussed missing meds on [facility] form. Confirmed patient is taking Acetaminophen, Tums, and Warfarin. In discussing patient's meds, Milli was going to fax over updated list, but in talking with another home care nurse, they already received updated signed orders from Dr. Naga Russell. Nothing further needed at this time    Ange GARCIAN, RN, CPN

## 2020-06-15 NOTE — PROGRESS NOTES
Clinic Care Coordination Contact  [facility]/Voicemail       Clinical Data: CHW Outreach  Outreach attempted x 1.line busy unable to leave voicemail.    Plan: CHW will try to reach patient again in 1-2 business days.    Teresa STEVE Community Health Worker  Clinic Care Coordination  [facility] : [place]  Phone: [phone]        Reason for Referral: Care Transition: Inpatient to outpatient

## 2020-06-15 NOTE — PROGRESS NOTES
SUBJECTIVE:  Nan Doran, a 84 year old female scheduled an appointment to discuss the following issues:  Follow-up hospitalization for acute systolic CHF and given one unit pRBCs. Patient is s/p watchman procedure 6/2/2020 for a.fib. patient seen by cardiology and diuresed with IV Bumex to weight of 125 lbs and changed to oral bumex 1mg daily. Needs follow-up bmp and blood pressure/ weight check.   Scale at home - at senior care.   On coumadin for another 6-8 weeks and then will recheck echo at time. No bloody stools. Shortness of breath improving overall. Emotionally doing ok.   Needs to do rehab exercises more. Home health doing INR's.   Medical, social, surgical, and family histories reviewed.    ROS:  All other ROS negative.     OBJECTIVE:  BP 96/56   Pulse 88   Temp 97.8  F (36.6  C) (Oral)   Wt 56.7 kg (125 lb)   BMI 25.25 kg/m    EXAM:  GENERAL APPEARANCE: healthy, alert and no distress  NECK: no adenopathy, no asymmetry, masses, or scars and thyroid normal to palpation  RESP: lungs clear to auscultation - no rales, rhonchi or wheezes  CV: regular rates and rhythm, normal S1 S2, no S3 or S4 and no murmur, click or rub -  ABDOMEN:  soft, nontender, no HSM or masses and bowel sounds normal  MS: extremities normal- no gross deformities noted, no evidence of inflammation in joints, FROM in all extremities.  MS: trace bilateral LOWER EXTREMETIES edema.   PSYCH: mentation appears normal and affect normal/bright    ASSESSMENT / PLAN:  (I50.9) Other congestive heart failure (H)  (primary encounter diagnosis)  Comment: stalbe  Plan: Renal panel (Alb, BUN, Ca, Cl, CO2, Creat,         Gluc, Phos, K, Na), bumetanide (BUMEX) 0.5 MG         tablet        Will lower bumex to 0.5 because of lower blood pressure and weight stable. Continue daily weights and take extra bumex is needed. Follow-up per  Cardiology. Rapid weight gain/ shortness of breath back to Er.    (I48.20) Chronic atrial fibrillation (H)  Comment:  stable  Plan: per cardiology - hopefully off coumadin in 6 weeks. Expected course and warning signs reviewed.     (I10) Hypertension goal BP (blood pressure) < 140/90  Comment:a little low  Plan: Renal panel (Alb, BUN, Ca, Cl, CO2, Creat,         Gluc, Phos, K, Na), bumetanide (BUMEX) 0.5 MG         tablet        See above with bumex. Continue fruits in diet for potassium.     Naga Russell MD

## 2020-06-15 NOTE — TELEPHONE ENCOUNTER
Candis nurse from [facility] calling to let Dr Russell know that patient was discharged from [facility] on Friday and was to have a basic metabolic panel drawn today but patient refused this test. EL

## 2020-06-15 NOTE — LETTER
June 16, 2020      Nan Doran  [address] [place] [address]        Dear ,    Generally normal results except kidney tests worse. This will likely improve with lower bumex dosage. Drink more water too. Recheck in 3 months. Potassium ok.    Resulted Orders   Renal panel (Alb, BUN, Ca, Cl, CO2, Creat, Gluc, Phos, K, Na)   Result Value Ref Range    Sodium 134 133 - 144 mmol/L    Potassium 3.7 3.4 - 5.3 mmol/L    Chloride 97 94 - 109 mmol/L    Carbon Dioxide 30 20 - 32 mmol/L    Anion Gap 7 3 - 14 mmol/L    Glucose 105 (H) 70 - 99 mg/dL      Comment:      Non Fasting    Urea Nitrogen 40 (H) 7 - 30 mg/dL    Creatinine 2.03 (H) 0.52 - 1.04 mg/dL    GFR Estimate 22 (L) >60 mL/min/[1.73_m2]      Comment:      Non  GFR Calc  Starting 12/18/2018, serum creatinine based estimated GFR (eGFR) will be   calculated using the Chronic Kidney Disease Epidemiology Collaboration   (CKD-EPI) equation.      GFR Estimate If Black 26 (L) >60 mL/min/[1.73_m2]      Comment:       GFR Calc  Starting 12/18/2018, serum creatinine based estimated GFR (eGFR) will be   calculated using the Chronic Kidney Disease Epidemiology Collaboration   (CKD-EPI) equation.      Calcium 8.7 8.5 - 10.1 mg/dL    Phosphorus 3.0 2.5 - 4.5 mg/dL    Albumin 2.4 (L) 3.4 - 5.0 g/dL       If you have any questions or concerns, please call the clinic at the number listed above.       Sincerely,        Naga Russell MD

## 2020-06-16 NOTE — TELEPHONE ENCOUNTER
Yanique is calling from [facility] and they need orders to discharge daily weights and blood pressures, patient does not want them, they were ordered when she was discharged from the hospital.      Also, Would like a clarification of orders for bumetanide (BUMEX) 0.5 MG tablet.  They will need a new rx sent over to them.  Please call to advise.  Thank you

## 2020-06-16 NOTE — TELEPHONE ENCOUNTER
I need a minimum of blood pressure/ weight check x2/week. Ok bumex prescription as ordered. Naga Russell MD

## 2020-06-16 NOTE — TELEPHONE ENCOUNTER
Spoke with nurse Worthington. Given orders for dose and recheck INR in  1 wk. Will also fax orders to [facility] [phone]. Jen Leon RN

## 2020-06-16 NOTE — TELEPHONE ENCOUNTER
"Provider:  EL about the \"extra dose\" of Bumex and lab from the hospital.  Let us know if you want a different plan. If you do want the BMP Akila would like to bring her into the clinic to have this done. Thank you. Sagrario Chanel is the nurse manager     Orders were given to Yanique as written below, but she states that the daughter and Nan are refusing weight and blood pressure.    Yanique states that the patient doesn't have access to any of her medication as they are set up weekly by the assisted living. If she is refusing weight/blood pressure this will be hard to follow the Bumex extra dose order, just FYI to Dr. Russell. Additionally,  home care lab has been trying to draw a BMP that was ordered from the hospital and results to be sent to you and the patient has refused to have the draw x 2.  The  home care lab will NOT try to obtain these labs any longer.        TC- Please fax SIGNED (by Dr. Russell) medication list to Yanique today (6/16/2020). Thank you. Sagrario Frey R.N.    [phone]  Att: Yanique   "

## 2020-06-16 NOTE — TELEPHONE ENCOUNTER
Reason for Call:  INR    Who is calling?  Home Care: [facility]    Phone number:  [phone]    Fax number:  na    Name of caller: Elin    INR Value:  1.8    Are there any other concerns:  No    Can we leave a detailed message on this number? YES    Phone number patient can be reached at: Home number on file [phone] (home)      Call taken on 6/16/2020 at 9:23 AM by Karen Sanchez

## 2020-06-16 NOTE — PROGRESS NOTES
CHW reached out to patient on 06/04/2020 and patient declined CCC at that time. CHW will not reach out to patient due to it has been less then 30 days since last outreach.         Teresa STEVE, Community Health Worker  Clinic Care Coordination  [facility] : [place]  Phone: [phone]      Reason for Referral: Care Transition: Inpatient to outpatient

## 2020-06-16 NOTE — PROGRESS NOTES
ANTICOAGULATION FOLLOW-UP CLINIC VISIT    Patient Name:  Nan Doran  Date:  2020  Contact Type:  Telephone    SUBJECTIVE:  Patient Findings     Positives:   Hospital admission    Comments:   Spoke with nurse Elin. Reports INR 1.8 today. Chart review shows hospital admission - for SOB and anemia. Had some bruising and bleeding post watchman procedure at groin site. Was given transfusion. INR remained therapeutic and warfarin 1 mg daily was continued. Continue to check INR weekly until Echocardiogram post Watchman. Given orders for dose and recheck INR in  1 wk. Will also fax orders to [facility] [phone]        Clinical Outcomes     Comments:   Spoke with nurse Elin. Reports INR 1.8 today. Chart review shows hospital admission - for SOB and anemia. Had some bruising and bleeding post watchman procedure at groin site. Was given transfusion. INR remained therapeutic and warfarin 1 mg daily was continued. Continue to check INR weekly until Echocardiogram post Watchman. Given orders for dose and recheck INR in  1 wk. Will also [facility] to [facility] [place] [address] [phone]           OBJECTIVE    Recent labs: (last 7 days)     20   INR 1.8*       ASSESSMENT / PLAN  INR assessment THER    Recheck INR In: 1 WEEK    INR Location [facility] INR      Anticoagulation Summary  As of 2020    INR goal:   1.5-2.0   TTR:   30.9 % (4.6 mo)   INR used for dosin.8 (2020)   Warfarin maintenance plan:   1 mg (1 mg x 1) every day   Full warfarin instructions:   1 mg every day   Weekly warfarin total:   7 mg   No change documented:   Jen Leon RN   Plan last modified:   Jen Leon RN (2020)   Next INR check:   2020   Priority:   High   Target end date:   Indefinite    Indications    Chronic anticoagulation [Z79.01]  Chronic atrial fibrillation (H) [I48.20]             Anticoagulation Episode Summary     INR check location:       Preferred lab:       Send INR  reminders to:   CON PATINO    Comments:   Goal 1.5-2.5 is out of protocol. If INR out of this range , consult with pharmacist or provider to advise.       Anticoagulation Care Providers     Provider Role Specialty Phone number    Naga Russell MD Referring [facility] [phone]            See the Encounter Report to view Anticoagulation Flowsheet and Dosing Calendar (Go to Encounters tab in chart review, and find the Anticoagulation Therapy Visit)    Dosage adjustment made based on physician directed care plan.    Jen Leon RN

## 2020-06-16 NOTE — TELEPHONE ENCOUNTER
Reason for Call:  Home Health Care    Elin with [facility] called regarding (reason for call): orders    Orders are needed for this patient.     PT: eval and treat    OT: eval and treat    Skilled NursinX week for 6 weeks    Pt Provider: Drew    Phone Number Homecare Nurse can be reached at: [phone]    Can we leave a detailed message on this number? YES    Phone number patient can be reached at: Other phone number:      Best Time: any    Call taken on 2020 at 9:25 AM by Karen Sanchez

## 2020-06-16 NOTE — TELEPHONE ENCOUNTER
Consent to communicate with Akila Stahl (daughter on file dated 3/10/2020. (TD - 6/16/2020)     Per [facility] assisted living nurse manager: The hospital orders on discharge stated BMP 3 days after and results to go to Dr. Russell. This is why the lab was coming to try to draw these labs.  Nan is refusing this draw x 2 and the assisted living and [facility] lab will no longer pursue the BMP lab  (it is [facility] home care lab trying to draw the BMP).     Daughter was informed that the hospital ordered the BMP and it will not be pursued as she refused x 2. I informed her that we are faxing the order over for the Bumex to the assisted living.  I also informed Akila that, at a minimum, the assisted living will need to get BP/weight twice a week, per Dr. Russell, as the Bumex dosing is contingent on it.  Daughter verbalizes good understanding, agrees with plan and states she needs no further support. Sagrario Frey R.N.

## 2020-06-16 NOTE — TELEPHONE ENCOUNTER
Patient's daughter is calling stating that her mother is in [facility] and for the past couple of days someone from [facility] is coming into her room to take blood for labs.  Nan has been refusing this lab work, as they have no idea why they need these done.  Per daughter,  Nan seen Dr. Russell on Monday and he should have done all of the blood work necessary.  Also, Dr. Russell changed a Medication and the living center does not have record of this change, so they will not give it to her.  Please call daughter to discuss these matters.  Thank you

## 2020-06-16 NOTE — TELEPHONE ENCOUNTER
Returned call to [facility] and left voicemail approving requested orders.  Advised to call clinic back if any questions.     Alyse LEON, RN

## 2020-06-16 NOTE — TELEPHONE ENCOUNTER
Routing to PCP to comment on orders being discontinued.  RN can have TC fax new order for Bumex over.     Alyse GARCIAN, RN

## 2020-06-17 NOTE — TELEPHONE ENCOUNTER
Reason for Call: Request for an order or referral:    Order or referral being requested: In home PT 2 times a week for 3 weeks and 1 time a week for 3 weeks.    Date needed: as soon as possible    Has the patient been seen by the PCP for this problem? Not Applicable    Additional comments: This is for gait, transfer safety, balance, strengthening, and fall prevention.    Phone number Patient can be reached at:  Other phone number:  [phone]*    Best Time:  na    Can we leave a detailed message on this number?  YES, and OK to leave message on secure voice mail    Call taken on 6/17/2020 at 11:40 AM by Jesus Stone

## 2020-06-23 NOTE — PROGRESS NOTES
ANTICOAGULATION FOLLOW-UP CLINIC VISIT    Patient Name:  Nan Doran  Date:  2020  Contact Type:  Telephone    SUBJECTIVE:  Patient Findings     Comments:   Home care nurse reports INR 1.8 today. No other changes reported. This is therapeutic in her range of 1.5- 2.5. Needs weekly INR done until cardiology/Watchman follow up. Verbal order given to Elin and will fax to [facility] [phone].        Clinical Outcomes     Negatives:   Major bleeding event, Thromboembolic event, Anticoagulation-related hospital admission, Anticoagulation-related ED visit, Anticoagulation-related fatality    Comments:   Home care nurse reports INR 1.8 today. No other changes reported. This is therapeutic in her range of 1.5- 2.5. Needs weekly INR done until cardiology/Watchman follow up. Verbal order given to Elin and will fax to [facility] [phone].           OBJECTIVE    Recent labs: (last 7 days)     20   INR 1.8*       ASSESSMENT / PLAN  INR assessment THER    Recheck INR In: 1 WEEK    INR Location [facility]care INR      Anticoagulation Summary  As of 2020    INR goal:   1.5-2.0   TTR:   34.2 % (4.8 mo)   INR used for dosin.8 (2020)   Warfarin maintenance plan:   1 mg (1 mg x 1) every day   Full warfarin instructions:   1 mg every day   Weekly warfarin total:   7 mg   No change documented:   Jen Leon RN   Plan last modified:   Jen Leon RN (2020)   Next INR check:   2020   Priority:   High   Target end date:   Indefinite    Indications    Chronic anticoagulation [Z79.01]  Chronic atrial fibrillation (H) [I48.20]             Anticoagulation Episode Summary     INR check location:       Preferred lab:       Send INR reminders to:   CON PATINO    Comments:   Goal 1.5-2.5 is out of protocol. If INR out of this range , consult with pharmacist or provider to advise.       Anticoagulation Care Providers     Provider Role Specialty Phone number    Naga Russell  MD [facility] [phone]            See the Encounter Report to view Anticoagulation Flowsheet and Dosing Calendar (Go to Encounters tab in chart review, and find the Anticoagulation Therapy Visit)    Dosage adjustment made based on physician directed care plan.    Jen Leon RN

## 2020-06-23 NOTE — TELEPHONE ENCOUNTER
Reason for Call:  INR    Who is calling?  Home Care: [facility]    Phone number:  [phone]    Fax number:  na    Name of caller: Elin    INR Value:  1.8    Are there any other concerns:  No    Can we leave a detailed message on this number? YES    Phone number patient can be reached at: Cell number on file:                 Call taken on 6/23/2020 at 10:01 AM by Karen Sanchez

## 2020-06-25 NOTE — TELEPHONE ENCOUNTER
See 6/16 telephone encounter.  Daughter states assisted living giving Bumex 1mg, patient's blood pressure is low  Patient does get weight and blood pressure checked by  home care nurse when she comes to get her INRs weekly and also when has therapy  Patient has been refusing a couple of aids coming into her room as they are not wearing masks  Informed daughter Tamy had reached out for orders clarification and I have a call out to her to call back to discuss.     Ange GARCIAN, RN, CPN

## 2020-06-25 NOTE — TELEPHONE ENCOUNTER
Tamy from [facility] status of message, has not heard back on Bumex dosage and orders for weight and blood pressure. Would like a call back today please.

## 2020-06-25 NOTE — TELEPHONE ENCOUNTER
Faxed medication list that was signed and dated from DR Russell to [phone]. I called to see if they have received it, they had not, but there was a 35 page fax coming through right now.Batsheva Roth MA/STACEY

## 2020-06-25 NOTE — TELEPHONE ENCOUNTER
TC -  Please fax a signed (by Dr. Russell) medication list to the number below.  This was to be done on 6/16/2020.  After faxing please call the facility to ensure they have received this.  Thank you. Sagrario Frey R.N.      Please fax to [phone]

## 2020-06-25 NOTE — TELEPHONE ENCOUNTER
Daughter states that someone from here needs to call [facility] with patient's med change for her Bumex to.5 mg because the home is giving her 1.0 mg and slowly killing her.  Please call Tamy at [phone] to get it changed.    Thank you.

## 2020-06-30 NOTE — TELEPHONE ENCOUNTER
Spoke with home care nurse. Plan continue same warfarin dose and weekly INR checks. Requested I fax orders to [facility] [phone].See anticoagulation encounter. Jen Leon RN

## 2020-06-30 NOTE — TELEPHONE ENCOUNTER
Reason for Call:  Other call back    Detailed comments: home care is calling to report results for INR: 1.9, looking for next dose and recheck. Please call with orders. Thank you.    Phone Number Patient can be reached at: [phone]    Best Time:     Can we leave a detailed message on this number? YES    Call taken on 6/30/2020 at 9:20 AM by Desiree Dooley

## 2020-06-30 NOTE — PROGRESS NOTES
ANTICOAGULATION FOLLOW-UP CLINIC VISIT    Patient Name:  Nan Doran  Date:  2020  Contact Type:  Telephone    SUBJECTIVE:  Patient Findings     Comments:   INR 1.9 per home care. INR range 1.5-2.5. Nurse reports no changes or concerns or updates. Patient resides at assisted living. Orders for dose and next INR to be faxed to [facility] at [phone].         Clinical Outcomes     Comments:   INR 1.9 per home care. INR range 1.5-2.5. Nurse reports no changes or concerns or updates. Patient resides at assisted living. Orders for dose and next INR to be faxed to [facility] at [phone].            OBJECTIVE    Recent labs: (last 7 days)     20   INR 1.9*       ASSESSMENT / PLAN  INR assessment THER    Recheck INR In: 1 WEEK    INR Location Homecare INR      Anticoagulation Summary  As of 2020    INR goal:   1.5-2.0   TTR:   37.3 % (5.1 mo)   INR used for dosin.9 (2020)   Warfarin maintenance plan:   1 mg (1 mg x 1) every day   Full warfarin instructions:   1 mg every day   Weekly warfarin total:   7 mg   Plan last modified:   Jen Leon RN (2020)   Next INR check:   2020   Priority:   High   Target end date:   Indefinite    Indications    Chronic anticoagulation [Z79.01]  Chronic atrial fibrillation (H) [I48.20]             Anticoagulation Episode Summary     INR check location:       Preferred lab:       Send INR reminders to:   CON PATINO    Comments:   Goal 1.5-2.5 is out of protocol. If INR out of this range , consult with pharmacist or provider to advise.       Anticoagulation Care Providers     Provider Role Specialty Phone number    Naga Russell MD Referring [facility] [phone]            See the Encounter Report to view Anticoagulation Flowsheet and Dosing Calendar (Go to Encounters tab in chart review, and find the Anticoagulation Therapy Visit)    Dosage adjustment made based on physician directed care plan.    Jen  ILIANA Leon, RN

## 2020-07-07 NOTE — TELEPHONE ENCOUNTER
Reporting INR  2.2    Please call as soon as possible. In the bldg now.   Ok to leave a detailed message.

## 2020-07-07 NOTE — PROGRESS NOTES
ANTICOAGULATION FOLLOW-UP CLINIC VISIT    Patient Name:  Nan Doran  Date:  2020  Contact Type:  Telephone    SUBJECTIVE:  Patient Findings     Comments:   INR 2.2 per home health nurse with no change or concern reported. Therapeutic.  INR range is 1.5-2.5. Need to continue weekly INR checks. Requests next INR on Wednesday. Orders faxed to [facility] at [phone]        Clinical Outcomes     Negatives:   Major bleeding event, Thromboembolic event, Anticoagulation-related hospital admission, Anticoagulation-related ED visit, Anticoagulation-related fatality    Comments:   INR 2.2 per home health nurse with no change or concern reported. Therapeutic.  INR range is 1.5-2.5. Need to continue weekly INR checks. Requests next INR on Wednesday. Orders faxed to [facility] at [phone]           OBJECTIVE    Recent labs: (last 7 days)     20   INR 2.2*       ASSESSMENT / PLAN  INR assessment THER    Recheck INR In: 8 DAYS    INR Location [facility] INR      Anticoagulation Summary  As of 2020    INR goal:   1.5-2.0   TTR:   37.1 % (5.3 mo)   INR used for dosin.2! (2020)   Warfarin maintenance plan:   1 mg (1 mg x 1) every day   Full warfarin instructions:   1 mg every day   Weekly warfarin total:   7 mg   No change documented:   Jen Leon RN   Plan last modified:   Jen Leon RN (2020)   Next INR check:   7/15/2020   Priority:   High   Target end date:   Indefinite    Indications    Chronic anticoagulation [Z79.01]  Chronic atrial fibrillation (H) [I48.20]             Anticoagulation Episode Summary     INR check location:       Preferred lab:       Send INR reminders to:   CON PATINO    Comments:   Goal 1.5-2.5 is out of protocol. If INR out of this range , consult with pharmacist or provider to advise.       Anticoagulation Care Providers     Provider Role Specialty Phone number    Naga Russell MD [facility] [phone]             See the Encounter Report to view Anticoagulation Flowsheet and Dosing Calendar (Go to Encounters tab in chart review, and find the Anticoagulation Therapy Visit)    Dosage adjustment made based on physician directed care plan.    Jen Leon RN

## 2020-07-07 NOTE — TELEPHONE ENCOUNTER
Spoke with nurses. She requests INR next week on Wednesday instead of Tuesday. Plan continue same warfarin dose. See anticoagulation encounter. Jen Leon RN

## 2020-07-10 NOTE — TELEPHONE ENCOUNTER
Per patients daughter, her PB is low and has been for several months. Wanting to know why she is still taking her BP medication. Wanting an order sent to her senior living center to stop taking the medication. Please call to discuss. Eva Ewing TC/Pt Rep

## 2020-07-10 NOTE — TELEPHONE ENCOUNTER
See cardiology note 6/24/20, metoprolol stopped.  Med list updated.     Pt daughter states she was just looking at [facility].  Advised if someone did not tell us that the medication was stopped we would not update the chart.  Now that she has told us, I can see it was stopped by the cardiologist and the med list has been updated.      She said the last blood pressures in epic were low, last one 6-15-20.  Advised med stopped after that and we would not [facility] only in a phone message if they were relayed to us since we did not take them.      Advised I would call and confirm metoprolol was stopped with assisted living nurse.  I called and she confirmed this.  She was not at the site so could not give me any current bp's.  Pt daughter notified.  Savi GARCIAN, RN

## 2020-07-14 NOTE — TELEPHONE ENCOUNTER
Spoke with nurse. Today is last home care nurse visit as it was ordered for 6 weeks post watchman procedure. Patient scheduled for DAMON next week and will need to be determined after that if needs further INR and if will be stopping warfarin. Will fax warfarin dose orders to [facility] at [phone]. If further INR needed this can be arranged at facility or have daughter bring patient to clinic lab. Jen Leon RN

## 2020-07-14 NOTE — PATIENT INSTRUCTIONS
Patient has scheduled DAMON with cardiology on 7/22/20. Cardiology will determine if further INR check is needed and if warfarin will be stopped or continued after this.

## 2020-07-14 NOTE — PROGRESS NOTES
ANTICOAGULATION FOLLOW-UP CLINIC VISIT    Patient Name:  Nan Doran  Date:  2020  Contact Type:  Telephone    SUBJECTIVE:  Patient Findings     Comments:   INR 2.6 today reported by home care nurse. Spoke with nurse. Today is last home care nurse visit as it was ordered for 6 weeks post watchman procedure. Patient scheduled for DAMON next week and will need to be determined after that if needs further INR and if will be stopping warfarin. Will fax warfarin dose orders to [facility] at [phone]. If further INR needed this can be arranged at facility or have daughter bring patient to clinic lab.        Clinical Outcomes     Comments:   INR 2.6 today reported by home care nurse. Spoke with nurse. Today is last home care nurse visit as it was ordered for 6 weeks post watchman procedure. Patient scheduled for DAMON next week and will need to be determined after that if needs further INR and if will be stopping warfarin. Will fax warfarin dose orders to [facility] at [phone]. If further INR needed this can be arranged at facility or have daughter bring patient to clinic lab.           OBJECTIVE    Recent labs: (last 7 days)     20   INR 2.6*       ASSESSMENT / PLAN  INR assessment SUPRA    Recheck INR In: 2 WEEKS    INR Location [facility] INR      Anticoagulation Summary  As of 2020    INR goal:   1.5-2.0   TTR:   35.5 % (5.5 mo)   INR used for dosin.6! (2020)   Warfarin maintenance plan:   1 mg (1 mg x 1) every day   Full warfarin instructions:   : 0.5 mg; Otherwise 1 mg every day   Weekly warfarin total:   7 mg   Plan last modified:   Jen Leon RN (2020)   Next INR check:   2020   Priority:   High   Target end date:   Indefinite    Indications    Chronic anticoagulation [Z79.01]  Chronic atrial fibrillation (H) [I48.20]             Anticoagulation Episode Summary     INR check location:       Preferred lab:       Send INR reminders to:   CON  [facility]    Comments:   Goal 1.5-2.5 is out of protocol. If INR out of this range , consult with pharmacist or provider to advise.       Anticoagulation Care Providers     Provider Role Specialty Phone number    Naga Russell MD Referring [facility] [phone]            See the Encounter Report to view Anticoagulation Flowsheet and Dosing Calendar (Go to Encounters tab in chart review, and find the Anticoagulation Therapy Visit)    Dosage adjustment made based on physician directed care plan.    Jen Leon RN

## 2020-07-14 NOTE — TELEPHONE ENCOUNTER
Reporting INR  2.6  No changes. Ok to leave a detailed message.    This is last day. Patient will be discharged.

## 2020-07-20 NOTE — TELEPHONE ENCOUNTER
I called number listed and they said Lana was not there.  I called home care and reached Elin FU care coordinator for pt.  Verbal order as requested given to Elin FU.    She states they will not be doing the INR on Wed. Pt is having ultrasound at cardiology on Wed and said she would get it done there.  To INR nurse as FYI.  Savi GARCIAN, RN

## 2020-07-20 NOTE — TELEPHONE ENCOUNTER
Reason for Call:  Home Health Care    Lana with [facility] called regarding (reason for call): orders    Orders are needed for this patient.     PT: is being discharged from home care PT    OT: jannette    Skilled Nursing: eval and treat for left forearm wound due to fall     Pt Provider: Drew    Phone Number Homecare Nurse can be reached at: [phone]    Can we leave a detailed message on this number? YES      Best Time: any    Call taken on 7/20/2020 at 1:49 PM by Karen Sanchez

## 2020-07-22 NOTE — TELEPHONE ENCOUNTER
Needing verbal orders for skilled nursing for wound care 2x for 2 weeks. OK to [facility]. Eva Ewing TC/Pt Rep

## 2020-08-14 NOTE — TELEPHONE ENCOUNTER
"Requesting written order for compression socks sent to below vendor. Needing written order to say: \"bilateral knee high compression stockings 15-20mmhg\"    OK to [facility] Fax: [phone]    Eva Ewing TC/Pt Rep    "

## 2020-08-19 NOTE — TELEPHONE ENCOUNTER
Reason for Call:  Form, our goal is to have forms completed with 72 hours, however, some forms may require a visit or additional information.    Type of letter, form or note:  Home Health Certification    Who is the form from?: Home care    Where did the form come from: form was faxed in    What clinic location was the form placed at?: [place]    Where the form was placed: Given to MA/RN    What number is listed as a contact on the form?: [phone]       Additional comments: Placed on RN [facility] Desk    Call taken on 8/19/2020 at 8:05 AM by Lana Berrios

## 2020-08-19 NOTE — TELEPHONE ENCOUNTER
This RN reconciled medication list from [facility] form against our [facility] medication list and there are discrepancies.  They are:        1)ibuprofen on [facility] list and not [facility] list,  I spoke with assisted living and it is not on their current list for pt.  Can I remove from [facility] list?    2) coumadin on [facility] list.  I confirmed with assisted living nurse that pt is no longer taking this after her procedure.  She is now on Plavix 75 mg daily.  Can I remove coumadin from [facility] list and add Plavix to [facility] and home care med lists?    3) assisted living nurse advised pt is on amiodarone 100 mg daily, previous 200 mg daily dose was tapering down dose.  Can I update home care and [facility] lists?    4) Tylenol dose on [facility] list is 500 mg q6h.  Assisted living nurse and home care nurse have 1000 mg q6h prn pain listed.  Can I update [facility] list to say 1000 mg q6h?     5) home care list states Bumex 1 mg daily.  Assisted living nurse states pt is using 0.5 mg  Daily and will take an extra pill daily if bp > 140/90 or a 2 lb weight gain, this matches [facility] list.  Can I update home care list to match this dosing?    Savi GARCIAN, RN

## 2020-08-21 NOTE — TELEPHONE ENCOUNTER
Fatimah states [facility] needs an order from you saying it is OK for them to take patient's compression socks on and off.  Please fax to [phone].    Thank you.

## 2020-09-16 NOTE — TELEPHONE ENCOUNTER
Reason for Call:  Other call back    Detailed comments: [employer] is calling to check on status on RX for wound care supply sent: 07/24 and 08/14 by Elin at [facility]. Per caller will fax another one today 09/16 in case needs it again. Please fill out and send back, or call for status. Thank you.    Phone Number Patient can be reached at: [phone]    Best Time:     Can we leave a detailed message on this number? YES    Call taken on 9/16/2020 at 4:18 PM by Desiree Dooley

## 2020-09-24 ENCOUNTER — PATIENT OUTREACH (OUTPATIENT)
Dept: CARE COORDINATION | Facility: CLINIC | Age: 85
End: 2020-09-24

## 2020-09-24 NOTE — PROGRESS NOTES
Clinic Care Coordination Contact    Situation: Patient chart reviewed by care coordinator.    Background: patient appears on [facility] discharge report as a potential care coordination candidate.     Assessment: patient passed away while IP at [facility].    Plan/Recommendations: CCRN sent email to [facility] Legal/Probate Team for notification of death. No further action will be taken. FYI to PCP.     RADHA ColmenaresN RN Clinic Care Coordinator   [place], [place], [place]  Phone: [phone]

## 2021-11-16 NOTE — TELEPHONE ENCOUNTER
Reason for Call:  INR    Who is calling?  Home Care: [facility]    Phone number:  [phone]    Fax number:      Name of caller: [place]    INR Value:  1.9    Are there any other concerns:  No    Can we leave a detailed message on this number? YES    Phone number patient can be reached at: Home number on file [phone] (home)      Call taken on 1/23/2020 at 9:46 AM by Eva Ewing       Vaccine Information Statement(s) or the Emergency Use Authorization was given today. This has been reviewed, questions answered, and verbal consent given by Patient for injection(s) and administration of Influenza (Inactivated).      Patient tolerated without incident. See immunization grid for documentation.

## 2023-01-24 NOTE — TELEPHONE ENCOUNTER
Medication lists reconciled and updated.  Savi Elder BSN, RN     Second attempt made to contact patient to complete 2023 yearly pharmacy appointment for Diabetes Management Program.    No answer. Left VM. [employer] message sent to patient.       Oskar Weldon, [address]   Phone: [phone], option #3       For Pharmacy Admin Tracking Only    Program: [address] place:  No  Gap Closed?: No   Time Spent (min): 10